# Patient Record
Sex: FEMALE | Race: WHITE | Employment: OTHER | URBAN - METROPOLITAN AREA
[De-identification: names, ages, dates, MRNs, and addresses within clinical notes are randomized per-mention and may not be internally consistent; named-entity substitution may affect disease eponyms.]

---

## 2017-01-23 ENCOUNTER — TRANSFERRED RECORDS (OUTPATIENT)
Dept: HEALTH INFORMATION MANAGEMENT | Facility: CLINIC | Age: 47
End: 2017-01-23

## 2017-08-12 ENCOUNTER — HEALTH MAINTENANCE LETTER (OUTPATIENT)
Age: 47
End: 2017-08-12

## 2017-08-24 ENCOUNTER — TRANSFERRED RECORDS (OUTPATIENT)
Dept: HEALTH INFORMATION MANAGEMENT | Facility: CLINIC | Age: 47
End: 2017-08-24

## 2017-11-17 ENCOUNTER — TRANSFERRED RECORDS (OUTPATIENT)
Dept: HEALTH INFORMATION MANAGEMENT | Facility: CLINIC | Age: 47
End: 2017-11-17

## 2018-01-22 ENCOUNTER — OFFICE VISIT (OUTPATIENT)
Dept: FAMILY MEDICINE | Facility: CLINIC | Age: 48
End: 2018-01-22
Payer: COMMERCIAL

## 2018-01-22 ENCOUNTER — HOSPITAL ENCOUNTER (OUTPATIENT)
Dept: MAMMOGRAPHY | Facility: CLINIC | Age: 48
Discharge: HOME | End: 2018-01-22
Attending: FAMILY MEDICINE | Admitting: FAMILY MEDICINE
Payer: COMMERCIAL

## 2018-01-22 ENCOUNTER — RESULT FOLLOW UP (OUTPATIENT)
Dept: OBGYN | Facility: CLINIC | Age: 48
End: 2018-01-22

## 2018-01-22 VITALS
SYSTOLIC BLOOD PRESSURE: 129 MMHG | HEART RATE: 122 BPM | HEIGHT: 65 IN | BODY MASS INDEX: 25.86 KG/M2 | OXYGEN SATURATION: 97 % | WEIGHT: 155.2 LBS | DIASTOLIC BLOOD PRESSURE: 83 MMHG | TEMPERATURE: 98.3 F

## 2018-01-22 DIAGNOSIS — Z12.31 VISIT FOR SCREENING MAMMOGRAM: ICD-10-CM

## 2018-01-22 DIAGNOSIS — Z12.4 SCREENING FOR MALIGNANT NEOPLASM OF CERVIX: ICD-10-CM

## 2018-01-22 DIAGNOSIS — R87.810 CERVICAL HIGH RISK HPV (HUMAN PAPILLOMAVIRUS) TEST POSITIVE: ICD-10-CM

## 2018-01-22 DIAGNOSIS — Z00.00 HEALTHCARE MAINTENANCE: Primary | ICD-10-CM

## 2018-01-22 DIAGNOSIS — E05.00 GRAVES DISEASE: ICD-10-CM

## 2018-01-22 PROCEDURE — 87624 HPV HI-RISK TYP POOLED RSLT: CPT | Performed by: INTERNAL MEDICINE

## 2018-01-22 PROCEDURE — 77067 SCR MAMMO BI INCL CAD: CPT

## 2018-01-22 PROCEDURE — 99386 PREV VISIT NEW AGE 40-64: CPT | Performed by: INTERNAL MEDICINE

## 2018-01-22 PROCEDURE — G0145 SCR C/V CYTO,THINLAYER,RESCR: HCPCS | Performed by: INTERNAL MEDICINE

## 2018-01-22 NOTE — LETTER
April 5, 2019      Vania BREWER Gerard  [address] [place] [address]      Dear Ms. Gee,    At [facility], your health and wellness is our primary concern. That is why we are following up on a positive high risk HPV test  from 02/06/19, which was reported as HPV 16.  Your Provider had recommended that you have a Colposcopy  completed by 05/06/19. Our records do not show that this has been scheduled.    It is important to complete the follow up that your provider has suggested for you to ensure that there are no worsening changes which may, over time, develop into cancer.      Please call our office at  [phone] to schedule an appointment for a Colposcopy (this cannot be scheduled through [facility]) at your earliest convenience. If you have questions or concerns, please call the clinic and we will be happy to assist you.    If you have completed the tests outside of [facility], please have the results forwarded to our office. We will update the chart for your primary Physician to review before your next annual physical.     Thank you for choosing [facility]!    Sincerely,      Your [place] [facility] Care Team/[facility]

## 2018-01-22 NOTE — PATIENT INSTRUCTIONS
Make an appointment for a lab draw after you have been fasting (no food or drink except water and black coffee) for at least 8 hours.        CALCIUM    Recommendations:  Teenagers and premenopausal women: 1200 mg/day    If you are not eating dairy products you also need 400 IU of vitamin D per day which can be obtained in either a multivitamin or in some of the Calcium tablets.    Dietary sources: These also contain vitamin D  Milk                            8 oz            300 mg  Yogurt                          1 cup           400 mg  Hard cheese                     1.5 oz          300 mg  Cottage cheese                  2 cup           300 mg  Orange juice with Calcium       8 oz            300 mg  Low fat dairy sources are recommended    Supplements:  Tums EX                         300 mg  Tums Ultra                      400 mg  Caltrate 600                    600 mg  Oscal                           500 mg  Oscal/D                         500 mg plus vitamin D  Women's Formula Multivitamin    450 mg        Preventive Health Recommendations  Female Ages 40 to 49    Yearly exam:     See your health care provider every year in order to  1. Review health changes.   2. Discuss preventive care.    3. Review your medicines if your doctor prescribed any.      Get a Pap test every three years (unless you have an abnormal result and your provider advises testing more often).      If you get Pap tests with HPV test, you only need to test every 5 years, unless you have an abnormal result. You do not need a Pap test if your uterus was removed (hysterectomy) and you have not had cancer.      You should be tested each year for STDs (sexually transmitted diseases), if you're at risk.       Ask your doctor if you should have a mammogram.      Have a colonoscopy (test for colon cancer) if someone in your family has had colon cancer or polyps before age 50.       Have a cholesterol test every 5 years.       Have a diabetes test  (fasting glucose) after age 45. If you are at risk for diabetes, you should have this test every 3 years.    Shots: Get a flu shot each year. Get a tetanus shot every 10 years.     Nutrition:     Eat at least 5 servings of fruits and vegetables each day.    Eat whole-grain bread, whole-wheat pasta and brown rice instead of white grains and rice.    Talk to your provider about Calcium and Vitamin D.     Lifestyle    Exercise at least 150 minutes a week (an average of 30 minutes a day, 5 days a week). This will help you control your weight and prevent disease.    Limit alcohol to one drink per day.    No smoking.     Wear sunscreen to prevent skin cancer.    See your dentist every six months for an exam and cleaning.

## 2018-01-22 NOTE — PROGRESS NOTES
SUBJECTIVE:   CC: Vania Gee is an 47 year old woman who presents for preventive health visit.     Chief Complaint   Patient presents with     Physical     w/ pap       Healthy Habits:    Do you get at least three servings of calcium containing foods daily (dairy, green leafy vegetables, etc.)? 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 yes    Do you see a dentist twice per year? No, once per year    Do you have sleep apnea, excessive snoring or daytime drowsiness?no      No other concerns    Today's PHQ-2 Score:   PHQ-2 ( 1999 [employer]) 1/22/2018 3/20/2014   Q1: Little interest or pleasure in doing things 0 0   Q2: Feeling down, depressed or hopeless 0 0   PHQ-2 Score 0 0       Abuse: Current or Past(Physical, Sexual or Emotional)- No  Do you feel safe in your environment - Yes    Social History   Substance Use Topics     Smoking status: Never Smoker     Smokeless tobacco: Never Used     Alcohol use Yes      Comment: 1 PER WEEK     If you drink alcohol do you typically have >3 drinks per day or >7 drinks per week? No                     Reviewed orders with patient.  Reviewed health maintenance and updated orders accordingly - Yes  Patient Active Problem List   Diagnosis     Graves disease     Past Surgical History:   Procedure Laterality Date     APPENDECTOMY       ECTOPIC PREGNANCY SURGERY         Social History   Substance Use Topics     Smoking status: Never Smoker     Smokeless tobacco: Never Used     Alcohol use Yes      Comment: 1 PER WEEK     Family History   Problem Relation Age of Onset     HEART DISEASE Mother      HEART DISEASE Father      Hypertension Father      HEART DISEASE Brother      PACE MAKER         No current  "outpatient prescriptions on file.     No Known Allergies      Patient under age 50, mutual decision reflected in health maintenance.        Pertinent mammograms are reviewed under the imaging tab.  History of abnormal Pap smear: NO - age 30-65 PAP every 5 years with negative HPV co-testing recommended    Reviewed and updated as needed this visit by clinical staffTobacco  Allergies  Med Hx  Surg Hx  Fam Hx  Soc Hx        Reviewed and updated as needed this visit by Provider              ROS:    10 point ROS of systems including Constitutional, Eyes, Respiratory, Cardiovascular, Gastroenterology, Genitourinary, Integumentary, Muscularskeletal, Psychiatric were all negative except for pertinent positives noted in my HPI plus occasional left arm pain that resolves if she shakes the arm out, exophthalmos in left eye    OBJECTIVE:   /83 (BP Location: Right arm, Patient Position: Chair, Cuff Size: Adult Regular)  Pulse 122  Temp 98.3  F (36.8  C) (Tympanic)  Ht 5' 5\" (1.651 m)  Wt 155 lb 3.2 oz (70.4 kg)  SpO2 97%  BMI 25.83 kg/m2  EXAM:  GENERAL: healthy, alert and no distress  EYES: Left exophthalmos, PERRL and conjunctivae and sclerae normal  HENT: ear canals and TM's normal, nose and mouth without ulcers or lesions  NECK: no adenopathy, no asymmetry, masses, or scars and thyroid normal to palpation  RESP: lungs clear to auscultation - no rales, rhonchi or wheezes  CV: slightly tachycardic but regular, normal S1 S2, no S3 or S4, no murmur, click or rub, no peripheral edema and peripheral pulses strong  ABDOMEN: soft, nontender, no hepatosplenomegaly, no masses and bowel sounds normal   (female): normal female external genitalia, normal urethral meatus, vaginal mucosa pink, moist, well rugated, and normal cervix/adnexa/uterus without masses or discharge  MS: no gross musculoskeletal defects noted, no edema  SKIN: no suspicious lesions or rashes  NEURO: Normal strength and tone, mentation intact and " "speech normal  PSYCH: mentation appears normal, affect normal/bright    ASSESSMENT/PLAN:   1. Healthcare maintenance      Pap smear: done today    Immunizations: flu declined    Lab Studies: Glucose due now.  Lipids due next year    Mammogram: Recently completed    Colonoscopy: age 52    - GLUCOSE; Future  - Pap imaged thin layer screen with HPV - recommended age 30 - 65 years (select HPV order below)  - HPV High Risk Types DNA Cervical    2. Screening for malignant neoplasm of cervix    - Pap imaged thin layer screen with HPV - recommended age 30 - 65 years (select HPV order below)  - HPV High Risk Types DNA Cervical    3. Graves disease    She is being monitor for Graves disease, not on medication.  She says she is due for labs, ordered.     - T3, Free; Future  - T4, free; Future  - TSH; Future    COUNSELING:   Reviewed preventive health counseling, as reflected in patient instructions    BP Screening:   Last 3 BP Readings:    BP Readings from Last 3 Encounters:   01/22/18 129/83   03/20/14 116/80       The following was recommended to the patient:  Re-screen BP within a year and recommended lifestyle modifications     reports that she has never smoked. She has never used smokeless tobacco.    Estimated body mass index is 25.83 kg/(m^2) as calculated from the following:    Height as of this encounter: 5' 5\" (1.651 m).    Weight as of this encounter: 155 lb 3.2 oz (70.4 kg).   Weight management plan: Discussed healthy diet and exercise guidelines and patient will follow up in 12 months in clinic to re-evaluate.      Shay Olmos MD  [facility]  "

## 2018-01-22 NOTE — MR AVS SNAPSHOT
After Visit Summary   1/22/2018    Vania Gee    MRN: [id number]           Patient Information     Date Of Birth          1970        Visit Information        Provider Department      1/22/2018 5:00 PM Shay Olmos MD [facility]        Today's [facility] maintenance    -  1    Screening for malignant neoplasm of cervix        Graves disease          Care Instructions    Make an appointment for a lab draw after you have been fasting (no food or drink except water and black coffee) for at least 8 hours.        CALCIUM    Recommendations:  Teenagers and premenopausal women: 1200 mg/day    If you are not eating dairy products you also need 400 IU of vitamin D per day which can be obtained in either a multivitamin or in some of the Calcium tablets.    Dietary sources: These also contain vitamin D  Milk                            8 oz            300 mg  Yogurt                          1 cup           400 mg  Hard cheese                     1.5 oz          300 mg  Cottage cheese                  2 cup           300 mg  Orange juice with Calcium       8 oz            300 mg  Low fat dairy sources are recommended    Supplements:  Tums EX                         300 mg  Tums Ultra                      400 mg  Caltrate 600                    600 mg  Oscal                           500 mg  Oscal/D                         500 mg plus vitamin D  Women's Formula Multivitamin    450 mg        Preventive Health Recommendations  Female Ages 40 to 49    Yearly exam:     See your health care provider every year in order to  1. Review health changes.   2. Discuss preventive care.    3. Review your medicines if your doctor prescribed any.      Get a Pap test every three years (unless you have an abnormal result and your provider advises testing more often).      If you get Pap tests with HPV test, you only need to test every 5 years, unless you have an abnormal result. You do not need  a Pap test if your uterus was removed (hysterectomy) and you have not had cancer.      You should be tested each year for STDs (sexually transmitted diseases), if you're at risk.       Ask your doctor if you should have a mammogram.      Have a colonoscopy (test for colon cancer) if someone in your family has had colon cancer or polyps before age 50.       Have a cholesterol test every 5 years.       Have a diabetes test (fasting glucose) after age 45. If you are at risk for diabetes, you should have this test every 3 years.    Shots: Get a flu shot each year. Get a tetanus shot every 10 years.     Nutrition:     Eat at least 5 servings of fruits and vegetables each day.    Eat whole-grain bread, whole-wheat pasta and brown rice instead of white grains and rice.    Talk to your provider about Calcium and Vitamin D.     Lifestyle    Exercise at least 150 minutes a week (an average of 30 minutes a day, 5 days a week). This will help you control your weight and prevent disease.    Limit alcohol to one drink per day.    No smoking.     Wear sunscreen to prevent skin cancer.    See your dentist every six months for an exam and cleaning.          Follow-ups after your visit        Your next 10 appointments already scheduled     Feb 06, 2018  3:45 PM CST   NEW THYROID EYE with Jose Gillespie MD   [facility] Peds Eye General ([facility])    [address] [id number] [phone]              Future tests that were ordered for you today     Open Future Orders        Priority Expected Expires Ordered    GLUCOSE Routine  1/22/2019 1/22/2018    T3, Free Routine  1/22/2019 1/22/2018    T4, free Routine  1/22/2019 1/22/2018    TSH Routine  1/22/2019 1/22/2018            Who to contact     If you have questions or need follow up information about today's clinic visit or your schedule please contact [facility] directly at [phone].  Normal or non-critical lab  "and imaging results will be communicated to you by MyChart, letter or phone within 4 business days after the clinic has received the results. If you do not hear from us within 7 days, please contact the clinic through [employer] or phone. If you have a critical or abnormal lab result, we will notify you by phone as soon as possible.  Submit refill requests through [facility] As [facility] or call your pharmacy and they will forward the refill request to us. Please allow 3 business days for your refill to be completed.          Additional Information About Your Visit        [employer] Information     [facility] gives you secure access to your electronic health record. If you see a primary care provider, you can also send messages to your care team and make appointments. If you have questions, please call your primary care clinic.  If you do not have a primary care provider, please call [phone] and they will assist you.        Care EveryWhere ID     This is your Care EveryWhere ID. This could be used by other organizations to access your [place] medical records  [id number]        Your Vitals Were     Pulse Temperature Height Pulse Oximetry BMI (Body Mass Index)       122 98.3  F (36.8  C) (Tympanic) 5' 5\" (1.651 m) 97% 25.83 kg/m2        Blood Pressure from Last 3 Encounters:   01/22/18 129/83   03/20/14 116/80    Weight from Last 3 Encounters:   01/22/18 155 lb 3.2 oz (70.4 kg)   03/20/14 141 lb 12.8 oz (64.3 kg)              We Performed the Following     HPV High Risk Types DNA Cervical     Pap imaged thin layer screen with HPV - recommended age 30 - 65 years (select HPV order below)        Primary Care Provider Fax #    Physician No Ref-Primary [phone]       No address on file        Equal Access to Services     HONG RICCI : Micki Kwok, devante carrasquillo, kayode astorga. So [facility] [phone].    ATENCIÓN: Si habla español, tiene a gomez disposición servicios " brain de asistencia lingüística. Silver montes [phone].    We comply with applicable federal civil rights laws and [place] laws. We do not discriminate on the basis of race, color, national origin, age, disability, sex, sexual orientation, or gender identity.            Thank you!     Thank you for choosing [facility]  for your care. Our goal is always to provide you with excellent care. Hearing back from our patients is one way we can continue to improve our services. Please take a few minutes to complete the written survey that you may receive in the mail after your visit with us. Thank you!             Your Updated Medication List - Protect others around you: Learn how to safely use, store and throw away your medicines at www.disposemymeds.org.      Notice  As of 1/22/2018  5:28 PM    You have not been prescribed any medications.

## 2018-01-22 NOTE — NURSING NOTE
"Chief Complaint   Patient presents with     Physical     w/ pap       Initial /83 (BP Location: Right arm, Patient Position: Chair, Cuff Size: Adult Regular)  Pulse 122  Temp 98.3  F (36.8  C) (Tympanic)  Ht 5' 5\" (1.651 m)  Wt 155 lb 3.2 oz (70.4 kg)  SpO2 97%  BMI 25.83 kg/m2 Estimated body mass index is 25.83 kg/(m^2) as calculated from the following:    Height as of this encounter: 5' 5\" (1.651 m).    Weight as of this encounter: 155 lb 3.2 oz (70.4 kg).  Medication Reconciliation: complete   Maribel PACKER CMA (AAMA)    "

## 2018-01-24 LAB
COPATH REPORT: NORMAL
PAP: NORMAL

## 2018-01-26 PROBLEM — R87.810 CERVICAL HIGH RISK HPV (HUMAN PAPILLOMAVIRUS) TEST POSITIVE: Status: ACTIVE | Noted: 2018-01-22

## 2018-01-26 LAB
FINAL DIAGNOSIS: ABNORMAL
HPV HR 12 DNA CVX QL NAA+PROBE: NEGATIVE
HPV16 DNA SPEC QL NAA+PROBE: NEGATIVE
HPV18 DNA SPEC QL NAA+PROBE: POSITIVE
SPECIMEN DESCRIPTION: ABNORMAL
SPECIMEN SOURCE CVX/VAG CYTO: ABNORMAL

## 2018-01-31 NOTE — PROGRESS NOTES
1/22/18 NIL pap, + HR HPV # 18 (no 16 or other). Plan: colp   1/31/18 Pt notified. She will call the [facility] to schedule the colp.  2/22/18 [facility] Bx & ECC - Negative. Polyp - normal endocervical tissue. Plan cotest in 1 year.   2/27/18 Provider released result to [employer] - pt has not viewed it yet.   2/27/18 Message left to return call.   2/27/18 Pt notified by phone of result and f/u plan.   2/6/19 NIL Pap, + HR HPV 16 (neg 18 & other). Plan colp  2/12/19 Pt notified.   04/05/19 [facility] reminder letter sent. ([facility])  5/7/19 3mo [facility] not done, updated to 6mo [facility]/Pap due by 8/6/19 (rlm)  6/13/19 [facility] ECC negative; cotest in 1 year (emilie)  6/19/19 Pt viewed result on [facility] (emilie)

## 2018-02-06 ENCOUNTER — OFFICE VISIT (OUTPATIENT)
Dept: OPHTHALMOLOGY | Facility: CLINIC | Age: 48
End: 2018-02-06
Attending: OPHTHALMOLOGY
Payer: COMMERCIAL

## 2018-02-06 DIAGNOSIS — E05.00 GRAVES DISEASE: ICD-10-CM

## 2018-02-06 DIAGNOSIS — E05.00 PROPTOSIS DUE TO THYROID DISORDER: ICD-10-CM

## 2018-02-06 DIAGNOSIS — H57.89 THYROID EYE DISEASE: ICD-10-CM

## 2018-02-06 DIAGNOSIS — H57.89 THYROID EYE DISEASE: Primary | ICD-10-CM

## 2018-02-06 DIAGNOSIS — H02.536 EYELID RETRACTION OF LEFT EYE: ICD-10-CM

## 2018-02-06 DIAGNOSIS — E07.9 THYROID EYE DISEASE: ICD-10-CM

## 2018-02-06 DIAGNOSIS — Z00.00 HEALTHCARE MAINTENANCE: ICD-10-CM

## 2018-02-06 DIAGNOSIS — E07.9 THYROID EYE DISEASE: Primary | ICD-10-CM

## 2018-02-06 LAB
GLUCOSE SERPL-MCNC: 93 MG/DL (ref 70–99)
T3FREE SERPL-MCNC: 3.5 PG/ML (ref 2.3–4.2)
T4 FREE SERPL-MCNC: 1.43 NG/DL (ref 0.76–1.46)
TSH SERPL DL<=0.005 MIU/L-ACNC: <0.01 MU/L (ref 0.4–4)
TSH SERPL DL<=0.005 MIU/L-ACNC: <0.01 MU/L (ref 0.4–4)

## 2018-02-06 PROCEDURE — 84443 ASSAY THYROID STIM HORMONE: CPT | Performed by: INTERNAL MEDICINE

## 2018-02-06 PROCEDURE — 84439 ASSAY OF FREE THYROXINE: CPT | Performed by: INTERNAL MEDICINE

## 2018-02-06 PROCEDURE — 84445 ASSAY OF TSI GLOBULIN: CPT | Performed by: INTERNAL MEDICINE

## 2018-02-06 PROCEDURE — 84481 FREE ASSAY (FT-3): CPT | Performed by: INTERNAL MEDICINE

## 2018-02-06 PROCEDURE — 82947 ASSAY GLUCOSE BLOOD QUANT: CPT | Performed by: INTERNAL MEDICINE

## 2018-02-06 PROCEDURE — 36415 COLL VENOUS BLD VENIPUNCTURE: CPT | Performed by: INTERNAL MEDICINE

## 2018-02-06 PROCEDURE — 92285 EXTERNAL OCULAR PHOTOGRAPHY: CPT | Mod: ZF | Performed by: OPHTHALMOLOGY

## 2018-02-06 PROCEDURE — G0463 HOSPITAL OUTPT CLINIC VISIT: HCPCS | Mod: ZF

## 2018-02-06 ASSESSMENT — VISUAL ACUITY
OS_SC: 20/20
OS_SC+: +2
OD_SC: 20/15
METHOD: SNELLEN - LINEAR

## 2018-02-06 ASSESSMENT — TONOMETRY
OD_IOP_MMHG: 13
IOP_METHOD: ICARE
OS_IOP_MMHG: 14

## 2018-02-06 ASSESSMENT — MARGIN REFLEX DISTANCE
OD_MRD2: 5
OS_MRD2: 5
OD_MRD1: 2
OS_MRD1: 7

## 2018-02-06 ASSESSMENT — CONF VISUAL FIELD
OS_NORMAL: 1
OD_NORMAL: 1
METHOD: COUNTING FINGERS

## 2018-02-06 ASSESSMENT — LAGOPHTHALMOS
OD_LAGOPHTHALMOS: 0
OS_LAGOPHTHALMOS: 0

## 2018-02-06 ASSESSMENT — CUP TO DISC RATIO
OS_RATIO: 0.2
OD_RATIO: 0.2

## 2018-02-06 ASSESSMENT — SLIT LAMP EXAM - LIDS
COMMENTS: NORMAL
COMMENTS: UPPER LID RETRACTION

## 2018-02-06 ASSESSMENT — EXTERNAL EXAM - LEFT EYE: OS_EXAM: UPPER EYELID RETRACTION

## 2018-02-06 ASSESSMENT — EXTERNAL EXAM - RIGHT EYE: OD_EXAM: NORMAL

## 2018-02-06 NOTE — MR AVS SNAPSHOT
After Visit Summary   2/6/2018    Vania Gee    MRN: [id number]           Patient Information     Date Of Birth          1970        Visit Information        Provider Department      2/6/2018 3:45 PM Jose Gillespie MD [facility] Peds Eye General        Today's Diagnoses     Thyroid eye disease    -  1       Follow-ups after your visit        Additional Services     ENDOCRINOLOGY ADULT REFERRAL       Your provider has referred you to: [facility]: Endocrinology and Diabetes Clinic [facility] [phone]   [email or web address]   Referred to see Dr. Patricia Martins    Please be aware that coverage of these services is subject to the terms and limitations of your health insurance plan.  Call member services at your health plan with any benefit or coverage questions.      Please bring the following to your appointment:    >>   Any x-rays, CTs or MRIs which have been performed.  Contact the facility where they were done to arrange for  prior to your scheduled appointment.    >>   List of current medications   >>   This referral request   >>   Any documents/labs given to you for this referral                  Your next 10 appointments already scheduled     Feb 22, 2018 11:00 AM CST   Office Visit with Boni Polk MD, [facility]   [facility]    [address] [address] [phone]           Bring a current list of meds and any records pertaining to this visit. For Physicals, please bring immunization records and any forms needing to be filled out. Please arrive 10 minutes early to complete paperwork.            May 01, 2018  1:00 PM CDT   RETURN THYROID EYE with Jose Gillespie MD   [facility] Peds Eye General ([facility])    [address]  [address] [address] [phone]              Future tests that were ordered for  you today     Open Future Orders        Priority Expected Expires Ordered    Thyroid stimulating immunoglobulin Routine  5/7/2018 2/6/2018    TSH with free T4 reflex Routine  5/7/2018 2/6/2018    T3 Free Routine  5/7/2018 2/6/2018            Who to contact     Please call your clinic at [phone] to:    Ask questions about your health    Make or cancel appointments    Discuss your medicines    Learn about your test results    Speak to your doctor   If you have compliments or concerns about an experience at your clinic, or if you wish to file a complaint, please contact [facility] Patient Relations at [phone] or email us at [email or web address]         Additional Information About Your Visit        [employer] Information     [employer] gives you secure access to your electronic health record. If you see a primary care provider, you can also send messages to your care team and make appointments. If you have questions, please call your primary care clinic.  If you do not have a primary care provider, please call [phone] and they will assist you.      [employer] is an electronic gateway that provides easy, online access to your medical records. With [employer], you can request a clinic appointment, read your test results, renew a prescription or communicate with your care team.     To access your existing account, please contact your [facility] or call [phone] for assistance.        Care EveryWhere ID     This is your Care EveryWhere ID. This could be used by other organizations to access your [place] medical records  [id number]         Blood Pressure from Last 3 Encounters:   01/22/18 129/83   03/20/14 116/80    Weight from Last 3 Encounters:   01/22/18 70.4 kg (155 lb 3.2 oz)   03/20/14 64.3 kg (141 lb 12.8 oz)              We Performed the Following     ENDOCRINOLOGY ADULT REFERRAL     External Photos Thyroid Series        Primary Care  Provider Fax #    Physician No Ref-Primary [phone]       No address on file        Equal Access to Services     HONG RICCI : Hadii aad ku hadnellyemily Vanessaengin, blairenick lanevicentaha, akshat jeff markveto, kayode garrisonin hayaaeliud floresharshil german david sierra. So [facility] [phone].    ATENCIÓN: Si habla español, tiene a gomez disposición servicios gratuitos de asistencia lingüística. Llame al [phone].    We comply with applicable federal civil rights laws and [place] laws. We do not discriminate on the basis of race, color, national origin, age, disability, sex, sexual orientation, or gender identity.            Thank you!     Thank you for choosing [facility]  for your care. Our goal is always to provide you with excellent care. Hearing back from our patients is one way we can continue to improve our services. Please take a few minutes to complete the written survey that you may receive in the mail after your visit with us. Thank you!             Your Updated Medication List - Protect others around you: Learn how to safely use, store and throw away your medicines at www.disposemymeds.org.      Notice  As of 2/6/2018  4:46 PM    You have not been prescribed any medications.

## 2018-02-06 NOTE — PROGRESS NOTES
Ophthalmology Thyroid Clinic New Patient (ITEDS)    Patient: Vania Gee MRN# [id number]   YOB: 1970 Age: 48 year old   Date of Visit: Feb 6, 2018    Chief Complaint: Vania Gee is a 48 year old year old female who presents for evaluation of thyroid eye disease.    Orbitopathy Thyroid General   Date of onset: October 2016  Chronic  Symptoms: dryness, pain, bulging Date of onset: Dec 2016, but had symptoms 8 months prior  Chronic  Symptoms: Weakness, shaking, heart racing, weight loss    No Pretibial myxedema/clubbing Occupation: farmer/realtor/  Smoking: none  Allergies: none  Family History:alopecia, vitiligo, thyroid issues  Selenium: not currently taking   Progress over last 2 months:  stable Progress over last 2 months:  Immune Disorders:  None     Ophthalmologist: none  Studies: none  Therapy: none Endocrinologist: none currently, has seen [facility] previously  Studies: ultrasound  Therapy: methimazole transiently, but currently not on anything    Current thyroid status: not clear at this time. Past Medical History:   Diagnosis Date     Cervical high risk HPV (human papillomavirus) test positive 1/22/2018 1/22/18 NIL pap, + HR HPV # 18 (no 16 or other). Plan: colp      Graves disease 1/22/2018       No current outpatient prescriptions on file.          Examination:  Base Eye Exam     Visual Acuity (Snellen - Linear)      Right Left   Dist sc 20/15 20/20 +2         Tonometry ([facility], 3:04 PM)      Right Left   Pressure 13 14         Pupils      Pupils APD   Right PERRL None   Left PERRL None         Visual Fields (Counting fingers)      Left Right   Result Full Full         Neuro/Psych     Mood/Affect:  Normal            Additional Tests     Color      Right Left   Ishihara 11/11 11/11         Billy     Billy:  Normal            Strabismus Exam        Method:  Alternate cover Distance Near Near +3.00DS Near Bifocals     Correction:  sc   Ortho'                         - - - - - -  RHypo 1 -1 -1 -tr    R Tilt                         Ortho  - -  - -  Ortho  -tr  - -  Ortho                      L Tilt       -tr -1 -1  Ortho  - - - - - -            DVD:      DVD:           Nystagmus:  None       AHP:  small Chin up                    Disease Grade (ITEDS)   V (optic neuropathy) I (inflammation) S (diplopia) S (restriction) A (appearance/exposure)   Scoring n Caruncular edema: 0  Chemosis: 0  Conj redness: 0  Lid redness: 0  Lid edema: 0  Retrobulbar ache: 1  Diurnal variation: 0   None: 0  With gaze: 1  Intermittent: 2  Constant: 3    Head tilt: n >45: 0  30-45: 1  15-30: 2  <15: 3   Lid stare: n  Light sensitivity: y  Bulging eyes: y  Tearing: n  Ocular irritation: y    None-severe (0-3)   Total 0/1 1/10 0/3 0/3 1/3   Progress  s s s s  s      Laboratory and Imaging Results:  TSH   Date Value Ref Range Status   12/12/2016 <0.01 (L) 0.40 - 4.00 mU/L Final   ]  T4 Free   Date Value Ref Range Status   12/12/2016 1.90 (H) 0.76 - 1.46 ng/dL Final     TSH 11/2017: <0.01  TSI: none previously    CT Scan: none  Visual field testing: none    Assessment/Plan:    Vania Gee is a 48 year old year old female who presents for evaluation of thyroid eye disease.  She initially noticed eye symptoms about 1.5 years ago.  Her thyroid disease is complicated and at this time, she has not consistently been on any therapy.  Over the past 6 months or so, her eyes have been stable. She is bothered by fatigue and discomfort primarily in the left eye and is bothered that her lid is too high on that side.    She is not a smoker, and will take selenium 100mcg daily.      On examination, she has mild left proptosis with upper lid retraction.  Her disease appears to be inactive at this time.    We discussed the natural history of thyroid eye disease and the importance of controlling thyroid levels.  We will obtain a TSI and a repeat TSH/T3/T4 today.  She would be interested in seeing endocrinology here  for an additional opinion- she felt that [place] endocrinology did not provide clear recommendations on medical treatment.    Discussed that we can consider left upper eyelid retraction repair, but would prefer to have TSI checked and thryoid levels under control prior to surgery.    Measurements on Hertels stable from [facility] exam from August 2017.    Refer to Patricia Martins with endocrinology.      Overall disease activity: Likely inactive.         Attending Physician Attestation:  Complete documentation of historical and exam elements from today's encounter can be found in the full encounter summary report (not reduplicated in this progress note). I personally obtained the chief complaint(s) and history of present illness.  I confirmed and edited as necessary the review of systems, past medical/surgical history, family history, social history, and examination findings as documented by others; and I examined the patient myself. I personally reviewed the relevant tests, images, and reports as documented above. I formulated and edited as necessary the assessment and plan and discussed the findings and management plan with the patient and family.  -Jose Kelley MD 3:18 PM 2/6/2018

## 2018-02-06 NOTE — LETTER
February 15, 2018    RE: Vania Gee  : 1970  MRN: [id number]    Dear Dr. Yunior Gomez:    Thank you for referring your patient, Vania Gee, to our multi-disciplinary thyroid eye disease clinic recently.  After a thorough history followed by a neuro-ophthalmology, strabismus, and oculoplastics examination, we came to the following conclusions:     Date of Visit: 2018    Chief Complaint: Vania Gee is a 48 year old year old female who presents for evaluation of thyroid eye disease.    Orbitopathy Thyroid General   Date of onset: 2016  Chronic  Symptoms: dryness, pain, bulging Date of onset: Dec 2016, but had symptoms 8 months prior  Chronic  Symptoms: Weakness, shaking, heart racing, weight loss    No Pretibial myxedema/clubbing Occupation: farmer/realtor/  Smoking: none  Allergies: none  Family History:alopecia, vitiligo, thyroid issues  Selenium: not currently taking   Progress over last 2 months:  stable Progress over last 2 months:  Immune Disorders:  None     Ophthalmologist: none  Studies: none  Therapy: none Endocrinologist: none currently, has seen [facility] previously  Studies: ultrasound  Therapy: methimazole transiently, but currently not on anything    Current thyroid status: not clear at this time. Past Medical History:   Diagnosis Date     Cervical high risk HPV (human papillomavirus) test positive 2018 NIL pap, + HR HPV # 18 (no 16 or other). Plan: colp      Graves disease 2018       No current outpatient prescriptions on file.          Examination:  Base Eye Exam     Visual Acuity (Snellen - Linear)      Right Left   Dist sc 20/15 20/20 +2         Tonometry ([facility], 3:04 PM)      Right Left   Pressure 13 14         Pupils      Pupils APD   Right PERRL None   Left PERRL None         Visual Fields (Counting fingers)      Left Right   Result Full Full         Neuro/Psych     Mood/Affect:  Normal            Additional Tests      Color      Right Left   Brianihara 11/11 11/11         Billy     Billy:  Normal            Strabismus Exam        Method:  Alternate cover Distance Near Near +3.00DS Near Bifocals     Correction:  sc   Ortho'                        - - - - - -  RHypo 1 -1 -1 -tr    R Tilt                         Ortho  - -  - -  Ortho  -tr  - -  Ortho                      L Tilt       -tr -1 -1  Ortho  - - - - - -            DVD:      DVD:           Nystagmus:  None       AHP:  small Chin up                    Disease Grade (ITEDS)   V (optic neuropathy) I (inflammation) S (diplopia) S (restriction) A (appearance/exposure)   Scoring n Caruncular edema: 0  Chemosis: 0  Conj redness: 0  Lid redness: 0  Lid edema: 0  Retrobulbar ache: 1  Diurnal variation: 0   None: 0  With gaze: 1  Intermittent: 2  Constant: 3    Head tilt: n >45: 0  30-45: 1  15-30: 2  <15: 3   Lid stare: n  Light sensitivity: y  Bulging eyes: y  Tearing: n  Ocular irritation: y    None-severe (0-3)   Total 0/1 1/10 0/3 0/3 1/3   Progress  s s s s  s      Laboratory and Imaging Results:  TSH   Date Value Ref Range Status   12/12/2016 <0.01 (L) 0.40 - 4.00 mU/L Final   ]  T4 Free   Date Value Ref Range Status   12/12/2016 1.90 (H) 0.76 - 1.46 ng/dL Final     TSH 11/2017: <0.01  TSI: none previously    CT Scan: none  Visual field testing: none    Assessment/Plan:  Vania Gee is a 48 year old year old female who presents for evaluation of thyroid eye disease.  She initially noticed eye symptoms about 1.5 years ago.  Her thyroid disease is complicated and at this time, she has not consistently been on any therapy.  Over the past 6 months or so, her eyes have been stable. She is bothered by fatigue and discomfort primarily in the left eye and is bothered that her lid is too high on that side.    She is not a smoker, and will take selenium 100mcg daily.      On examination, she has mild left proptosis with upper lid retraction.  Her disease appears to be inactive  at this time.    We discussed the natural history of thyroid eye disease and the importance of controlling thyroid levels.  We will obtain a TSI and a repeat TSH/T3/T4 today.  She would be interested in seeing endocrinology here for an additional opinion- she felt that [place] endocrinology did not provide clear recommendations on medical treatment.    Discussed that we can consider left upper eyelid retraction repair, but would prefer to have TSI checked and thryoid levels under control prior to surgery.    Measurements on Hertels stable from [facility] exam from August 2017.    Refer to Patricia Martins with endocrinology.      Overall disease activity: Likely inactive.      Thank you for trusting us with the care of your patient.  For further exam details, please feel free to contact our office for additional records.  If you wish to contact us directly regarding this patient please email us or give our clinic a call to arrange a phone conversation.    Sincerely,    Jose Gillespie MD  , Neuro-Ophthalmology and Adult Strabismus  Department of Ophthalmology and Visual Neurosciences  [facility]  [email or web address]    Seth Diaz MD    Oculoplastic and Orbital Surgery   Department of Ophthalmology and Visual Neurosciences  [facility]  [email or web address]    DX: thyroid eye disease, left upper eyelid retraction

## 2018-02-06 NOTE — NURSING NOTE
Chief Complaint   Patient presents with     Graves Thyrotoxic Exoph     Dx 1/17, had symptoms about 12 mos prior (lost weight, heart racing). Last labs were done 6 mos ago, would like them repeated. Denies diplopia, mild redness, tearing, no drops. Noted Left proptosis, seems stable since Dx. No decrease in vision/

## 2018-02-12 LAB — TSI SER-ACNC: <1 TSI INDEX

## 2018-02-22 ENCOUNTER — OFFICE VISIT (OUTPATIENT)
Dept: OBGYN | Facility: CLINIC | Age: 48
End: 2018-02-22
Payer: COMMERCIAL

## 2018-02-22 VITALS
BODY MASS INDEX: 24.63 KG/M2 | WEIGHT: 148 LBS | DIASTOLIC BLOOD PRESSURE: 97 MMHG | SYSTOLIC BLOOD PRESSURE: 135 MMHG | TEMPERATURE: 98.2 F | HEART RATE: 108 BPM

## 2018-02-22 DIAGNOSIS — N84.1 CERVICAL POLYP: ICD-10-CM

## 2018-02-22 DIAGNOSIS — R87.810 CERVICAL HIGH RISK HPV (HUMAN PAPILLOMAVIRUS) TEST POSITIVE: Primary | ICD-10-CM

## 2018-02-22 PROCEDURE — 88305 TISSUE EXAM BY PATHOLOGIST: CPT | Performed by: OBSTETRICS & GYNECOLOGY

## 2018-02-22 PROCEDURE — 57454 BX/CURETT OF CERVIX W/SCOPE: CPT | Performed by: OBSTETRICS & GYNECOLOGY

## 2018-02-22 NOTE — NURSING NOTE
"Initial BP (!) 135/97 (BP Location: Left arm, Patient Position: Chair, Cuff Size: Adult Regular)  Pulse 108  Temp 98.2  F (36.8  C) (Tympanic)  Wt 148 lb (67.1 kg)  Breastfeeding? No  BMI 24.63 kg/m2 Estimated body mass index is 24.63 kg/(m^2) as calculated from the following:    Height as of 1/22/18: 5' 5\" (1.651 m).    Weight as of this encounter: 148 lb (67.1 kg). .    Bria Arriaga    "

## 2018-02-22 NOTE — PROGRESS NOTES
Vania Gee is a 48 year old female P3 (SVDx3)   who presents for abnormal pap smear evaluation referred by Shay Escobar MD.     Pap smear hx is as follows:   1/22/18 NIL pap, + HR HPV # 18 (no 16 or other). Plan: colp       This will be her initial colposcopy.    No LMP recorded.   UPT today is negative      Patient does not smoke    Type of contraception: none  Age at first sexual intercourse: 17-18  Number of sexual partners (lifetime): 8  Past GYN history:  No STD history  Prior cervical/vaginal disease: Normal exam without visible pathology.  Prior cervical treatment: no treatment.      PROCEDURE:  Before the procedure, it was ensured that the patient was educated regarding the nature of her findings to date, the implications, and what was to be done. She has been made aware of the role of HPV, the natural history of infection, ways to minimize her future risk, the effect of HPV on the cervix, and treatment options available should they be indicated.  The details of the colposcopic procedure were reviewed.  All questions were answered before proceeding, and informed consent was therefore obtained.    Speculum placed in vagina and excellent visualization of cervix   acheived, cervix swabbed x 3 with acetic acid solution.    FINDINGS:  EXAM:  Blood pressure (!) 135/97, pulse 108, temperature 98.2  F (36.8  C), temperature source Tympanic, weight 148 lb (67.1 kg), not currently breastfeeding.  BMI= Body mass index is 24.63 kg/(m^2).  No LMP recorded.  General - pleasant female in no acute distress.  Neurological - alert and oriented X 3  Psychiatric - normal mood and affect    Pelvic-  Cervix: acetowhitening noted 5:00 and 11:00; biopsies taken (2)   Small cervical polyp was also seen.   Please refer to images section for details.    Pap repeated?:  No  SCJ seen?:  yes    ECC done?:  Yes   Lugol's solution used?:  No  Satisfactory examination?:  yes    Cervical polyp was removed with ringed forceps.      ASSESSMENT: 1) SHAJI 1. 2) Cervical polyp    PLAN: 1) specimens labelled and sent to Pathology, will base further treatment on Pathology findings, post biopsy instructions given to patient and call to discuss Pathology results    2) specimen labelled and sent to Pathology for confirmation, reassurance provided      A copy of the chart will be sent to the referring provider.    Boni Polk MD  [facility]

## 2018-02-22 NOTE — MR AVS SNAPSHOT
After Visit Summary   2/22/2018    Vania Gee    MRN: [id number]           Patient Information     Date Of Birth          1970        Visit Information        Provider Department      2/22/2018 11:00 AM Boni Polk MD; [facility]        Today's Diagnoses     Cervical high risk HPV (human papillomavirus) test positive    -  1    Cervical polyp           Follow-ups after your visit        Your next 10 appointments already scheduled     May 01, 2018  1:00 PM CDT   RETURN THYROID EYE with Jose Gillespie MD   [facility] ([facility])    [address]  [place] [address] [facility] [address] [phone]              Who to contact     If you have questions or need follow up information about today's clinic visit or your schedule please contact [facility] directly at [phone].  Normal or non-critical lab and imaging results will be communicated to you by MyChart, letter or phone within 4 business days after the clinic has received the results. If you do not hear from us within 7 days, please contact the clinic through MyChart or phone. If you have a critical or abnormal lab result, we will notify you by phone as soon as possible.  Submit refill requests through [employer] or call your pharmacy and they will forward the refill request to us. Please allow 3 business days for your refill to be completed.          Additional Information About Your Visit        MyChart Information     [employer] gives you secure access to your electronic health record. If you see a primary care provider, you can also send messages to your care team and make appointments. If you have questions, please call your primary care clinic.  If you do not have a primary care provider, please call [phone] and they will assist you.        Care EveryWhere ID     This is your Care EveryWhere ID. This could be used by other  organizations to access your [place] medical records  [id number]        Your Vitals Were     Pulse Temperature Breastfeeding? BMI (Body Mass Index)          108 98.2  F (36.8  C) (Tympanic) No 24.63 kg/m2         Blood Pressure from Last 3 Encounters:   02/22/18 (!) 135/97   01/22/18 129/83   03/20/14 116/80    Weight from Last 3 Encounters:   02/22/18 148 lb (67.1 kg)   01/22/18 155 lb 3.2 oz (70.4 kg)   03/20/14 141 lb 12.8 oz (64.3 kg)              We Performed the Following     COLP CERVIX/UPPER VAGINA W BX CERVIX/ENDOCERV CURETT     Surgical pathology exam        Primary Care Provider Fax #    Physician No Ref-Primary [phone]       No address on file        Equal Access to Services     HONG RICCI : Micki Kwok, wamirna luqadaha, qaybta kaalmanick contreras, kayode hernandez . So [facility] [phone].    ATENCIÓN: Si habla español, tiene a gomez disposición servicios gratuitos de asistencia lingüística. Llame al [phone].    We comply with applicable federal civil rights laws and [place] laws. We do not discriminate on the basis of race, color, national origin, age, disability, sex, sexual orientation, or gender identity.            Thank you!     Thank you for choosing [facility]  for your care. Our goal is always to provide you with excellent care. Hearing back from our patients is one way we can continue to improve our services. Please take a few minutes to complete the written survey that you may receive in the mail after your visit with us. Thank you!             Your Updated Medication List - Protect others around you: Learn how to safely use, store and throw away your medicines at www.disposemymeds.org.      Notice  As of 2/22/2018 12:45 PM    You have not been prescribed any medications.

## 2018-02-26 LAB — COPATH REPORT: NORMAL

## 2018-02-27 NOTE — PROGRESS NOTES
Inform patient that her colposcopy returned benign.   She is to return in 1 year for cotesting Pap smear.   Also, what was thought to be a polyp at her cervical opening was just normal endocervical tissue.       Boni Polk MD  [facility]

## 2018-03-08 ENCOUNTER — MEDICAL CORRESPONDENCE (OUTPATIENT)
Dept: HEALTH INFORMATION MANAGEMENT | Facility: CLINIC | Age: 48
End: 2018-03-08

## 2018-05-22 ENCOUNTER — TELEPHONE (OUTPATIENT)
Dept: FAMILY MEDICINE | Facility: CLINIC | Age: 48
End: 2018-05-22

## 2018-05-22 DIAGNOSIS — E05.00 GRAVES DISEASE: Primary | ICD-10-CM

## 2018-05-22 NOTE — TELEPHONE ENCOUNTER
Patient seen Dr. Olmos 01/22/18:  3. Graves disease     She is being monitor for Graves disease, not on medication.  She says she is due for labs, ordered.      - T3, Free; Future  - T4, free; Future  - TSH; Future    Looks like these labs were completed 02/06/18. I did not see further recommendations. I have pended some labs. Please review.     Clarita RITCHIE RN

## 2018-05-22 NOTE — TELEPHONE ENCOUNTER
Patient was notified of message below. I offered to give patient scheduling line but she did not have a pen/paper available and stated she cannot log into [employer]. She states she will check labs first then call back for number to schedule with the endocrinologist.      Clarita RITCHIE RN

## 2018-05-22 NOTE — TELEPHONE ENCOUNTER
Reason for Call: Request for an order or referral:    Order or referral being requested: Complete work up for thyroid panel - pt has graves disease.    Date needed: as soon as possible    Has the patient been seen by the PCP for this problem? Discussed at physical    Additional comments: Pt needs to have labs put in for complete work up for graves disease. She has these done routinely.    Phone number Patient can be reached at:  Home number on file [phone] (home)    Best Time:  any    Can we leave a detailed message on this number?      Call taken on 5/22/2018 at 8:47 AM by Naida Vazquez

## 2018-08-20 DIAGNOSIS — E05.00 GRAVES DISEASE: ICD-10-CM

## 2018-08-20 LAB
T3FREE SERPL-MCNC: 2.4 PG/ML (ref 2.3–4.2)
T4 FREE SERPL-MCNC: 0.87 NG/DL (ref 0.76–1.46)
TSH SERPL DL<=0.005 MIU/L-ACNC: 4.23 MU/L (ref 0.4–4)

## 2018-08-20 PROCEDURE — 84443 ASSAY THYROID STIM HORMONE: CPT | Performed by: INTERNAL MEDICINE

## 2018-08-20 PROCEDURE — 84439 ASSAY OF FREE THYROXINE: CPT | Performed by: INTERNAL MEDICINE

## 2018-08-20 PROCEDURE — 84481 FREE ASSAY (FT-3): CPT | Performed by: INTERNAL MEDICINE

## 2018-08-20 PROCEDURE — 36415 COLL VENOUS BLD VENIPUNCTURE: CPT | Performed by: INTERNAL MEDICINE

## 2018-10-02 ENCOUNTER — OFFICE VISIT (OUTPATIENT)
Dept: OPHTHALMOLOGY | Facility: CLINIC | Age: 48
End: 2018-10-02
Attending: OPHTHALMOLOGY
Payer: COMMERCIAL

## 2018-10-02 DIAGNOSIS — H57.89 THYROID EYE DISEASE: Primary | ICD-10-CM

## 2018-10-02 DIAGNOSIS — H02.401 INVOLUTIONAL PTOSIS, ACQUIRED, RIGHT: ICD-10-CM

## 2018-10-02 DIAGNOSIS — E07.9 THYROID EYE DISEASE: Primary | ICD-10-CM

## 2018-10-02 DIAGNOSIS — E05.00 PROPTOSIS DUE TO THYROID DISORDER: ICD-10-CM

## 2018-10-02 DIAGNOSIS — H02.536 EYELID RETRACTION OF LEFT EYE: ICD-10-CM

## 2018-10-02 PROCEDURE — G0463 HOSPITAL OUTPT CLINIC VISIT: HCPCS | Mod: 25,ZF

## 2018-10-02 PROCEDURE — 92082 INTERMEDIATE VISUAL FIELD XM: CPT | Mod: ZF | Performed by: OPHTHALMOLOGY

## 2018-10-02 PROCEDURE — 92285 EXTERNAL OCULAR PHOTOGRAPHY: CPT | Mod: ZF | Performed by: OPHTHALMOLOGY

## 2018-10-02 ASSESSMENT — CUP TO DISC RATIO
OS_RATIO: 0.3
OD_RATIO: 0.25

## 2018-10-02 ASSESSMENT — LAGOPHTHALMOS
OS_LAGOPHTHALMOS: 0
OD_LAGOPHTHALMOS: 0

## 2018-10-02 ASSESSMENT — TONOMETRY
OS_IOP_MMHG: 22
OD_IOP_MMHG: 22
IOP_METHOD: ICARE B/B, CB

## 2018-10-02 ASSESSMENT — CONF VISUAL FIELD
METHOD: COUNTING FINGERS
OD_NORMAL: 1
OS_NORMAL: 1

## 2018-10-02 ASSESSMENT — VISUAL ACUITY
OS_SC: 20/20
OS_SC+: +2
OD_SC: 20/15
METHOD: SNELLEN - LINEAR

## 2018-10-02 ASSESSMENT — MARGIN REFLEX DISTANCE
OS_MRD1: 8
OD_MRD1: 3

## 2018-10-02 ASSESSMENT — EXTERNAL EXAM - RIGHT EYE: OD_EXAM: NORMAL

## 2018-10-02 ASSESSMENT — SLIT LAMP EXAM - LIDS
COMMENTS: UPPER LID RETRACTION
COMMENTS: NORMAL

## 2018-10-02 ASSESSMENT — EXTERNAL EXAM - LEFT EYE: OS_EXAM: UPPER EYELID RETRACTION

## 2018-10-02 NOTE — NURSING NOTE
Chief Complaints and History of Present Illnesses   Patient presents with     Thyroid Disease     Vision is stable since last visit, no diplopia noted. Patient notes increased drooping RUL, has to hold lid up to watch TV, becoming very bothersome. Pressure behind LE is constant, has headaches in/around LE at least 2 times weekly, has to take asprin to relieve pain. No meds at this time, no Selenium.

## 2018-10-02 NOTE — PROGRESS NOTES
"   1. 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 Diaz.  2. High TSH and low T3/T4- has seen endocrinology at [facility] but she tells us that their recommendations were not clear.  Unclear whether she requires treatment for hyperthyroidism.  Will refer to [facility] endocrinology.    HPI: Vania Gee is a 48 year old year old female who presents for 8 months f/u of thyroid eye disease.  She initially noticed eye symptoms about 2016.  Her thyroid disease is complicated and at this time, she has not consistently been on any therapy.      Over the past 6 months or so, her eyes have been stable. She is bothered by fatigue and discomfort primarily around the right eye (ptotic and has to lift up her brow) and is bothered that her left upper eyelid is too high.    Thyroid history:     Diagnosed when? \"hyperthyroidism\" May or June of 2016.     DEWARDS: n/a     Thyroidectomy: n/a       TSI (date): <1.0  (Feb of 2018)        Eye symptoms (since when): 2016    Proptosis (better/worse/same since last visit): same     Diplopia(better/worse/same since last visit): none     Eyelid retraction(better/worse/same since last visit): same     Tearing(better/worse/same since last visit): none    Redness (better/worse/same since last visit): none    Pain ((better/worse/same since last visit): none    Pain to move the eyes (better/worse/same since last visit): none    Blurred vision: left appears blurry         Ocular history:     Orbital decompression (date, details): none    Strabismus surgery (date, details): none    Eyelid surgery (date, details): none        Exam:     Prabhakar (base):94    Better/worse same: 16/18    Strabismus (better/worse/same): none     Eyelid retraction (better/worse/same):       Her TSH 8/20/18 was 4.23, T3 at 2.4 and T4 0.87. Has been following Dr. Olmos (PCP) for her thyroid. Not on medications. Last visit with her was " Jan of 2018. Next visit in Jan of 2019.      Over the past 6 months or so, her eyes have been stable. She is bothered by fatigue and discomfort primarily around the right eye (ptotic and has to lift up her brow) and is bothered that her left upper eyelid is too high.     She is not a smoker.      On examination, she has mild left proptosis with upper lid retraction, which are stable. Her disease appears to be inactive at this time.    She is interested in right upper eyelid ptosis repair and left upper eyelid retraction repair. She is bothered by the eyelid retraction with eye irritation and redness. On the right side she has to keep pulling up on the right brow so she can see well when reading and driving.     She would be interested in seeing endocrinology here for an additional opinion, was not able to make it to her last referral. she felt that [facility] endocrinology did not provide clear recommendations on medical treatment. Her TSH is high and T3/4 low. SHe denies diarrhea, feeling hot, weight loss.     We will Re-refer her to an endocrinologist here.     Dr. Diaz discussed today the risks, benefits, and alternatives of eyelid surgery versus orbital decompression.  Patient wishes to pursue eyelid surgery to attempt to make the eye appearance more symmetrical.          Complete documentation of historical and exam elements from today's encounter can be found in the full encounter summary report (not reduplicated in this progress note).  I personally obtained the chief complaint(s) and history of present illness.  I confirmed and edited as necessary the review of systems, past medical/surgical history, family history, social history, and examination findings as documented by others; and I examined the patient myself.  I personally reviewed the relevant tests, images, and reports as documented above.  I formulated and edited as necessary the assessment and plan and discussed the findings and management plan  with the patient and family.  I personally reviewed the ophthalmic test(s) associated with this encounter, agree with the interpretation(s) as documented by the resident/fellow, and have edited the corresponding report(s) as necessary.     Jose Gillespie MD      Addendum (Dr. Diaz)      Plan:  She is not interested in orbital decompression despite   Right upper eyelid ptosis repair (levator + skin + koko)  Left upper eyelid retraction repair (FTB + Skin)    Ptosis visual field right superior visual field limited to 23 and improves to 55 with manual elevation of the eyelid.   Today with Vania Gee, I reviewed the indications, risks, benefits, and alternatives of the proposed surgical procedure including, but not limited to, failure obtain the desired result  and need for additional surgery, bleeding, infection, loss of vision, loss of the eye, and the remote possibility of permanent damage to any organ system or death with the use of anesthesia.  I provided multiple opportunities for the questions, answered all questions to the best of my ability, and confirmed that my answers and my discussion were understood.   MD Scott Correa MD  Neuro-ophthalmology Fellow

## 2018-10-02 NOTE — MR AVS SNAPSHOT
After Visit Summary   10/2/2018    Vania Gee    MRN: [id number]           Patient Information     Date Of Birth          1970        Visit Information        Provider Department      10/2/2018 12:30 PM Jose Gillespie MD [facility]        Today's Diagnoses     Thyroid eye disease    -  1    Proptosis due to thyroid disorder        Eyelid retraction of left eye        Involutional ptosis, acquired, right           Follow-ups after your visit        Who to contact     Please call your clinic at [phone] to:    Ask questions about your health    Make or cancel appointments    Discuss your medicines    Learn about your test results    Speak to your doctor            Additional Information About Your Visit        [employer] Information     [employer] gives you secure access to your electronic health record. If you see a primary care provider, you can also send messages to your care team and make appointments. If you have questions, please call your primary care clinic.  If you do not have a primary care provider, please call [phone] and they will assist you.      [employer] is an electronic gateway that provides easy, online access to your medical records. With [employer], you can request a clinic appointment, read your test results, renew a prescription or communicate with your care team.     To access your existing account, please contact your [facility] or call [phone] for assistance.        Care EveryWhere ID     This is your Care EveryWhere ID. This could be used by other organizations to access your [facility] medical records  [id number]         Blood Pressure from Last 3 Encounters:   02/22/18 (!) 135/97   01/22/18 129/83   03/20/14 116/80    Weight from Last 3 Encounters:   02/22/18 67.1 kg (148 lb)   01/22/18 70.4 kg (155 lb 3.2 oz)   03/20/14 64.3 kg (141 lb 12.8 oz)              We Performed the Following     External Photos OU  (both eyes)     [facility] VF Ptosis OD     Lana-Operative Worksheet (Plastics)        Primary Care Provider Fax #    Physician No Ref-Primary [phone]       No address on file        Equal Access to Services     HONG RICCI : Hadii aad ku hadnellyemily Rissa, wavandanada luqadaha, qaybta kaalmada ben, kayode parekh markharshil german david sierra. So [facility] [phone].    ATENCIÓN: Si habla español, tiene a gomez disposición servicios gratuitos de asistencia lingüística. Llame al [phone].    We comply with applicable federal civil rights laws and [place] laws. We do not discriminate on the basis of race, color, national origin, age, disability, sex, sexual orientation, or gender identity.            Thank you!     Thank you for choosing [facility]  for your care. Our goal is always to provide you with excellent care. Hearing back from our patients is one way we can continue to improve our services. Please take a few minutes to complete the written survey that you may receive in the mail after your visit with us. Thank you!             Your Updated Medication List - Protect others around you: Learn how to safely use, store and throw away your medicines at www.disposemymeds.org.      Notice  As of 10/2/2018 11:59 PM    You have not been prescribed any medications.

## 2018-10-05 ENCOUNTER — TELEPHONE (OUTPATIENT)
Dept: OPHTHALMOLOGY | Facility: CLINIC | Age: 48
End: 2018-10-05

## 2018-10-05 NOTE — TELEPHONE ENCOUNTER
Spoke with patient to schedule surgery with Dr. Seth Diaz.    Surgery was scheduled on 12/13 at [facility]  Patient will have H&P at [facility]   Post-Op visit was scheduled on 12/20  Patient is aware a / is needed day of surgery.   Surgery packet was mailed, patient has my direct contact information for any further questions.

## 2018-10-10 ENCOUNTER — NURSE TRIAGE (OUTPATIENT)
Dept: NURSING | Facility: CLINIC | Age: 48
End: 2018-10-10

## 2018-10-10 NOTE — TELEPHONE ENCOUNTER
Patient calls asking about ear symptoms. States had a cold and sore throat within the past week. States sore throat resolved. Yesterday noted small amount of blood from Left ear. Past two days has had yellow drainage from Left ear. Denies pain. Denies fever. Denies odor to discharge.   Concerned about her insurance and asking if needs to be seen by a Provider.    Protocol and care advice reviewed.  Advised to be seen by her Provider within 24 hours. States she will consider making an appointment tomorrow.   Caller states understanding of the recommended disposition.  Advised to call back if further questions or concerns.     Bindu Ramirez RN  [facility]     Reason for Disposition    Yellow or green discharge    Additional Information    Negative: [1] Followed head or face injury AND [2] clear or bloody fluid    Negative: [1] Unexplained bleeding AND [2] lasts > 10 minutes or large amount (Exception: if a few drops of blood, continue with triage)    Negative: Fever > 103 F (39.4 C)    Negative: Patient sounds very sick or weak to the triager    Negative: Ear pain    Negative: Fever > 100.5 F (38.1 C)    Negative: Foul-smelling discharge    Negative: Cloudy - white discharge    Negative: Diabetes mellitus or weak immune system (e.g., HIV positive, cancer chemo, splenectomy, organ transplant, chronic steroids)    Protocols used: EAR - DISCHARGE-ADULT-

## 2018-10-11 ENCOUNTER — OFFICE VISIT (OUTPATIENT)
Dept: FAMILY MEDICINE | Facility: CLINIC | Age: 48
End: 2018-10-11
Payer: COMMERCIAL

## 2018-10-11 VITALS
DIASTOLIC BLOOD PRESSURE: 84 MMHG | BODY MASS INDEX: 26.26 KG/M2 | SYSTOLIC BLOOD PRESSURE: 136 MMHG | HEART RATE: 85 BPM | OXYGEN SATURATION: 98 % | HEIGHT: 65 IN | TEMPERATURE: 97.7 F | RESPIRATION RATE: 16 BRPM | WEIGHT: 157.6 LBS

## 2018-10-11 DIAGNOSIS — J01.90 ACUTE SINUSITIS WITH SYMPTOMS > 10 DAYS: Primary | ICD-10-CM

## 2018-10-11 DIAGNOSIS — H66.012 ACUTE SUPPURATIVE OTITIS MEDIA OF LEFT EAR WITH SPONTANEOUS RUPTURE OF TYMPANIC MEMBRANE, RECURRENCE NOT SPECIFIED: ICD-10-CM

## 2018-10-11 PROCEDURE — 99213 OFFICE O/P EST LOW 20 MIN: CPT | Performed by: NURSE PRACTITIONER

## 2018-10-11 NOTE — PATIENT INSTRUCTIONS
Ruptured Infected Eardrum (Adult)    The middle ear is the space behind the eardrum. You have an infection of the middle ear. This can lead to pressure that causes the eardrum to break (rupture). This may cause sudden pain. Pus or blood will drain out of the ear canal. Your hearing will also likely be affected.  The infection may be treated with antibiotics. The eardrum usually heals completely on its own. If it does not, further treatment is needed. For this reason, it s important to have a follow-up exam with an ear specialist.  Home care    Keep taking prescribed antibiotics until all of the medicine is gone. Do this even if you feel better after the first few days.    Take any other medicines as prescribed.    Keep a clean cotton ball in the ear canal to absorb drainage. Change the cotton often, when it becomes soiled with fluid drainage. Don t let water get into the ear. Don t put any medicine drops into the ear unless your healthcare provider tells you to do so.  Follow-up care  Follow up with your healthcare provider in 2 weeks, or as advised. This is to make sure the infection is getting better and the eardrum is healing. Also follow up with specialists as advised for a hearing test or exam.  When to seek medical advice  Call your healthcare provider if you have any of the following:    Fever of 100.4 F (38 C) or higher    Pain that gets worse or doesn t get better    Unusual drowsiness or confusion    Headache, neck pain or a stiff neck    Convulsions (seizure)    Worsening dizziness    Worsening hearing loss or ringing in the ear  Date Last Reviewed: 10/1/2017    [id number] [employer]. [address] [place] [address]. All rights reserved. This information is not intended as a substitute for professional medical care. Always follow your healthcare professional's instructions.        Acute Bacterial Rhinosinusitis (ABRS)    Acute bacterial rhinosinusitis (ABRS) is an infection of  your nasal cavity and sinuses. It s caused by bacteria. Acute means that you ve had symptoms for less than 4 weeks, but possibly up to 12 weeks.  Understanding your sinuses  The nasal cavity is the large air-filled space behind your nose. The sinuses are a group of spaces formed by the bones of your face. They connect with your nasal cavity. ABRS causes the tissue lining these spaces to become inflamed. Mucus may not drain normally. This leads to facial pain and other symptoms.  What causes ABRS?  ABRS most often follows an upper respiratory infection caused by a virus. Bacteria then infect the lining of your nasal cavity and sinuses. But you can also get ABRS if you have:    Nasal allergies    Long-term nasal swelling and congestion not caused by allergies    Blockage in the nose  Symptoms of ABRS  The symptoms of ABRS may be different for each person and include:    Nasal congestion or blockage    Pain or pressure in the face    Thick, colored drainage from the nose  Other symptoms may include:    Runny nose    Fluid draining from the nose down the throat (postnasal drip)    Headache    Cough    Pain    Fever  Diagnosing ABRS  ABRS may be diagnosed if you ve had an upper respiratory infection like a cold and cough for 10 or more days without improvement or with worsening symptoms. Your healthcare provider will ask about your symptoms and your medical history. The provider will check your vital signs, including your temperature. You ll have a physical exam. The healthcare provider will check your ears, nose, and throat. You likely won t need any tests. If ABRS comes back, you may have a culture or other tests.  Treatment for ABRS  Treatment may include:    Antibiotic medicine. This is for symptoms that last for at least 10 to 14 days.    Nasal corticosteroid medicine. Drops or spray used in the nose can lessen swelling and congestion.    Over-the-counter pain medicine. This is to lessen sinus pain and  pressure.    Nasal decongestant medicine. Spray or drops may help to lessen congestion. Do not use them for more than a few days.    Salt wash (saline irrigation). This can help to loosen mucus.  Possible complications of ABRS  ABRS may come back or become long-term (chronic). In rare cases, ABRS may cause complications such as:     Inflamed tissue around the brain and spinal cord (meningitis)    Inflamed tissue around the eyes (orbital cellulitis)    Inflamed bones around the sinuses (osteitis)  These problems may need to be treated in a hospital with intravenous (IV) antibiotic medicine or surgery.  When to call the healthcare provider  Call your healthcare provider if you have any of the following:    Symptoms that don t get better, or get worse    Symptoms that don t get better after 3 to 5 days on antibiotics    Trouble seeing    Swelling around your eyes    Confusion or trouble staying awake   Date Last Reviewed: 5/1/2017 2000-2017 [employer]. [address] [place] [address], [place] [id number]. All rights reserved. This information is not intended as a substitute for professional medical care. Always follow your healthcare professional's instructions.

## 2018-10-11 NOTE — PROGRESS NOTES
SUBJECTIVE:   Vania Gee is a 48 year old female who presents to clinic today for the following health issues:      ENT Symptoms             Symptoms: cc Present Absent Comment   Fever/Chills   x    Fatigue  x     Muscle Aches  x     Eye Irritation   x    Sneezing  x     Nasal Vinay/Drg  x  Purulent drainage x 4 days, clear before that   Sinus Pressure/Pain   x    Loss of smell  x     Dental pain   x    Sore Throat  x  From cough   Swollen Glands   x    Ear Pain/Fullness x x  Left ear pain, small amount of blood and clear fluid draining from ear   Cough  x  nonproductive   Wheeze   x    Chest Pain   x    Shortness of breath   x    Rash   x    Other         Symptom duration:  11 days, ear started 4 days ago   Symptom severity:  moderate to severe   Treatments tried:  advil, dayquil, mucinex, sudafed   Contacts:  daughter has cold sx       Problem list and histories reviewed & adjusted, as indicated.  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     Cervical high risk HPV (human papillomavirus) test positive     Past Surgical History:   Procedure Laterality Date     APPENDECTOMY       COLPOSCOPY CERVIX, BIOPSY CERVIX, ENDOCERVICAL CURETTAGE, COMBINED  02/22/2018    Negative     ECTOPIC PREGNANCY SURGERY         Social History   Substance Use Topics     Smoking status: Never Smoker     Smokeless tobacco: Never Used     Alcohol use Yes      Comment: 1 PER WEEK     Family History   Problem Relation Age of Onset     HEART DISEASE Mother      Thyroid Disease Mother      HEART DISEASE Father      Hypertension Father      Thyroid Disease Maternal Grandmother      HEART DISEASE Brother      PACE MAKER     Amblyopia No family hx of      Strabismus No family hx of            Reviewed and updated as needed this visit by clinical staff       Reviewed and updated as needed this visit by Provider         ROS:  Constitutional, HEENT,  "cardiovascular, pulmonary, gi and gu systems are negative, except as otherwise noted.    OBJECTIVE:     /84 (BP Location: Right arm, Patient Position: Sitting, Cuff Size: Adult Regular)  Pulse 85  Temp 97.7  F (36.5  C) (Tympanic)  Resp 16  Ht 5' 5\" (1.651 m)  Wt 157 lb 9.6 oz (71.5 kg)  SpO2 98%  BMI 26.23 kg/m2  Body mass index is 26.23 kg/(m^2).  GENERAL: healthy, alert and no distress  EYES: Eyes grossly normal to inspection, PERRL and conjunctivae and sclerae normal  HENT: normal cephalic/atraumatic, right ear: normal: no effusions, no erythema, normal landmarks, left ear: erythematous, bulging membrane, mucopurulent effusion and no visible perforation, no visible drainage in canal, nose and mouth without ulcers or lesions, oropharynx clear, oral mucous membranes moist and sinuses: maxillary tenderness on both sides  NECK: no adenopathy, no asymmetry, masses, or scars and thyroid normal to palpation  RESP: lungs clear to auscultation - no rales, rhonchi or wheezes  CV: regular rates and rhythm, normal S1 S2, no S3 or S4 and no murmur, click or rub  MS: no gross musculoskeletal defects noted, no edema  SKIN: no suspicious lesions or rashes  NEURO: Normal strength and tone, mentation intact and speech normal  PSYCH: mentation appears normal, affect normal/bright    Diagnostic Test Results:  none     ASSESSMENT/PLAN:       ICD-10-CM    1. Acute sinusitis with symptoms > 10 days J01.90 amoxicillin-clavulanate (AUGMENTIN) 875-125 MG per tablet   2. Acute suppurative otitis media of left ear with spontaneous rupture of tympanic membrane, recurrence not specified H66.012 amoxicillin-clavulanate (AUGMENTIN) 875-125 MG per tablet     OTOLARYNGOLOGY REFERRAL       FUTURE APPOINTMENTS:       - Follow up in 3-5 days for symptoms that are not improving, sooner for new or worsening sx. See ENT if persistent hearing loss in 2-3 weeks. Avoid getting water in left ear for next 2 weeks.     Patient Instructions "       Ruptured Infected Eardrum (Adult)    The middle ear is the space behind the eardrum. You have an infection of the middle ear. This can lead to pressure that causes the eardrum to break (rupture). This may cause sudden pain. Pus or blood will drain out of the ear canal. Your hearing will also likely be affected.  The infection may be treated with antibiotics. The eardrum usually heals completely on its own. If it does not, further treatment is needed. For this reason, it s important to have a follow-up exam with an ear specialist.  Home care    Keep taking prescribed antibiotics until all of the medicine is gone. Do this even if you feel better after the first few days.    Take any other medicines as prescribed.    Keep a clean cotton ball in the ear canal to absorb drainage. Change the cotton often, when it becomes soiled with fluid drainage. Don t let water get into the ear. Don t put any medicine drops into the ear unless your healthcare provider tells you to do so.  Follow-up care  Follow up with your healthcare provider in 2 weeks, or as advised. This is to make sure the infection is getting better and the eardrum is healing. Also follow up with specialists as advised for a hearing test or exam.  When to seek medical advice  Call your healthcare provider if you have any of the following:    Fever of 100.4 F (38 C) or higher    Pain that gets worse or doesn t get better    Unusual drowsiness or confusion    Headache, neck pain or a stiff neck    Convulsions (seizure)    Worsening dizziness    Worsening hearing loss or ringing in the ear  Date Last Reviewed: 10/1/2017    2302-8637 [employer]. [address] [place] [address]. All rights reserved. This information is not intended as a substitute for professional medical care. Always follow your healthcare professional's instructions.        Acute Bacterial Rhinosinusitis (ABRS)    Acute bacterial rhinosinusitis (ABRS) is an infection  of your nasal cavity and sinuses. It s caused by bacteria. Acute means that you ve had symptoms for less than 4 weeks, but possibly up to 12 weeks.  Understanding your sinuses  The nasal cavity is the large air-filled space behind your nose. The sinuses are a group of spaces formed by the bones of your face. They connect with your nasal cavity. ABRS causes the tissue lining these spaces to become inflamed. Mucus may not drain normally. This leads to facial pain and other symptoms.  What causes ABRS?  ABRS most often follows an upper respiratory infection caused by a virus. Bacteria then infect the lining of your nasal cavity and sinuses. But you can also get ABRS if you have:    Nasal allergies    Long-term nasal swelling and congestion not caused by allergies    Blockage in the nose  Symptoms of ABRS  The symptoms of ABRS may be different for each person and include:    Nasal congestion or blockage    Pain or pressure in the face    Thick, colored drainage from the nose  Other symptoms may include:    Runny nose    Fluid draining from the nose down the throat (postnasal drip)    Headache    Cough    Pain    Fever  Diagnosing ABRS  ABRS may be diagnosed if you ve had an upper respiratory infection like a cold and cough for 10 or more days without improvement or with worsening symptoms. Your healthcare provider will ask about your symptoms and your medical history. The provider will check your vital signs, including your temperature. You ll have a physical exam. The healthcare provider will check your ears, nose, and throat. You likely won t need any tests. If ABRS comes back, you may have a culture or other tests.  Treatment for ABRS  Treatment may include:    Antibiotic medicine. This is for symptoms that last for at least 10 to 14 days.    Nasal corticosteroid medicine. Drops or spray used in the nose can lessen swelling and congestion.    Over-the-counter pain medicine. This is to lessen sinus pain and  pressure.    Nasal decongestant medicine. Spray or drops may help to lessen congestion. Do not use them for more than a few days.    Salt wash (saline irrigation). This can help to loosen mucus.  Possible complications of ABRS  ABRS may come back or become long-term (chronic). In rare cases, ABRS may cause complications such as:     Inflamed tissue around the brain and spinal cord (meningitis)    Inflamed tissue around the eyes (orbital cellulitis)    Inflamed bones around the sinuses (osteitis)  These problems may need to be treated in a hospital with intravenous (IV) antibiotic medicine or surgery.  When to call the healthcare provider  Call your healthcare provider if you have any of the following:    Symptoms that don t get better, or get worse    Symptoms that don t get better after 3 to 5 days on antibiotics    Trouble seeing    Swelling around your eyes    Confusion or trouble staying awake   Date Last Reviewed: 5/1/2017 2000-2017 [employer]. [address] [place] [address]. All rights reserved. This information is not intended as a substitute for professional medical care. Always follow your healthcare professional's instructions.            JOHNNIE Hensley [facility]

## 2018-10-11 NOTE — MR AVS SNAPSHOT
After Visit Summary   10/11/2018    Vania Gee    MRN: [id number]           Patient Information     Date Of Birth          1970        Visit Information        Provider Department      10/11/2018 9:20 AM Lisset Romero APRN [facility]        Today's Diagnoses     Acute sinusitis with symptoms > 10 days    -  1    Acute suppurative otitis media of left ear with spontaneous rupture of tympanic membrane, recurrence not specified          Care Instructions      Ruptured Infected Eardrum (Adult)    The middle ear is the space behind the eardrum. You have an infection of the middle ear. This can lead to pressure that causes the eardrum to break (rupture). This may cause sudden pain. Pus or blood will drain out of the ear canal. Your hearing will also likely be affected.  The infection may be treated with antibiotics. The eardrum usually heals completely on its own. If it does not, further treatment is needed. For this reason, it s important to have a follow-up exam with an ear specialist.  Home care    Keep taking prescribed antibiotics until all of the medicine is gone. Do this even if you feel better after the first few days.    Take any other medicines as prescribed.    Keep a clean cotton ball in the ear canal to absorb drainage. Change the cotton often, when it becomes soiled with fluid drainage. Don t let water get into the ear. Don t put any medicine drops into the ear unless your healthcare provider tells you to do so.  Follow-up care  Follow up with your healthcare provider in 2 weeks, or as advised. This is to make sure the infection is getting better and the eardrum is healing. Also follow up with specialists as advised for a hearing test or exam.  When to seek medical advice  Call your healthcare provider if you have any of the following:    Fever of 100.4 F (38 C) or higher    Pain that gets worse or doesn t get better    Unusual drowsiness or  confusion    Headache, neck pain or a stiff neck    Convulsions (seizure)    Worsening dizziness    Worsening hearing loss or ringing in the ear  Date Last Reviewed: 10/1/2017    5583-8568 [employer]. [address] [place] [address], [place] [address]. All rights reserved. This information is not intended as a substitute for professional medical care. Always follow your healthcare professional's instructions.        Acute Bacterial Rhinosinusitis (ABRS)    Acute bacterial rhinosinusitis (ABRS) is an infection of your nasal cavity and sinuses. It s caused by bacteria. Acute means that you ve had symptoms for less than 4 weeks, but possibly up to 12 weeks.  Understanding your sinuses  The nasal cavity is the large air-filled space behind your nose. The sinuses are a group of spaces formed by the bones of your face. They connect with your nasal cavity. ABRS causes the tissue lining these spaces to become inflamed. Mucus may not drain normally. This leads to facial pain and other symptoms.  What causes ABRS?  ABRS most often follows an upper respiratory infection caused by a virus. Bacteria then infect the lining of your nasal cavity and sinuses. But you can also get ABRS if you have:    Nasal allergies    Long-term nasal swelling and congestion not caused by allergies    Blockage in the nose  Symptoms of ABRS  The symptoms of ABRS may be different for each person and include:    Nasal congestion or blockage    Pain or pressure in the face    Thick, colored drainage from the nose  Other symptoms may include:    Runny nose    Fluid draining from the nose down the throat (postnasal drip)    Headache    Cough    Pain    Fever  Diagnosing ABRS  ABRS may be diagnosed if you ve had an upper respiratory infection like a cold and cough for 10 or more days without improvement or with worsening symptoms. Your healthcare provider will ask about your symptoms and your medical history. The provider will check your vital signs,  including your temperature. You ll have a physical exam. The healthcare provider will check your ears, nose, and throat. You likely won t need any tests. If ABRS comes back, you may have a culture or other tests.  Treatment for ABRS  Treatment may include:    Antibiotic medicine. This is for symptoms that last for at least 10 to 14 days.    Nasal corticosteroid medicine. Drops or spray used in the nose can lessen swelling and congestion.    Over-the-counter pain medicine. This is to lessen sinus pain and pressure.    Nasal decongestant medicine. Spray or drops may help to lessen congestion. Do not use them for more than a few days.    Salt wash (saline irrigation). This can help to loosen mucus.  Possible complications of ABRS  ABRS may come back or become long-term (chronic). In rare cases, ABRS may cause complications such as:     Inflamed tissue around the brain and spinal cord (meningitis)    Inflamed tissue around the eyes (orbital cellulitis)    Inflamed bones around the sinuses (osteitis)  These problems may need to be treated in a hospital with intravenous (IV) antibiotic medicine or surgery.  When to call the healthcare provider  Call your healthcare provider if you have any of the following:    Symptoms that don t get better, or get worse    Symptoms that don t get better after 3 to 5 days on antibiotics    Trouble seeing    Swelling around your eyes    Confusion or trouble staying awake   Date Last Reviewed: 5/1/2017 2000-2017 [employer]. [address] [place] [address]. All rights reserved. This information is not intended as a substitute for professional medical care. Always follow your healthcare professional's instructions.                Follow-ups after your visit        Additional Services     OTOLARYNGOLOGY REFERRAL       Your provider has referred you to: MARI: [facility] [phone]   [email or web address]    Please be aware  that coverage of these services is subject to the terms and limitations of your health insurance plan.  Call member services at your health plan with any benefit or coverage questions.      Please bring the following with you to your appointment:    (1) Any X-Rays, CTs or MRIs which have been performed.  Contact the facility where they were done to arrange for  prior to your scheduled appointment.   (2) List of current medications  (3) This referral request   (4) Any documents/labs given to you for this referral                  Your next 10 appointments already scheduled     Dec 13, 2018   Procedure with Seth Diaz MD   [facility] ([facility])    [address] [place] [address]  5th Floor  [facility] [address] [phone]           Located in the Clinics and Surgery Center at [address] [place] [address].   parking is very convenient and highly recommended.  is a $6 flat rate fee.  Both  and self parkers should enter the main arrival plaza from [facility]; parking attendants will direct you based on your parking preference.            Dec 20, 2018 10:15 AM CST   (Arrive by 10:00 AM)   Post-Op with Seth Diaz MD   [facility] Ophthalmology ([facility])    [address] [place] [address]  4th Floor  [facility] [address] [phone]              Who to contact     If you have questions or need follow up information about today's clinic visit or your schedule please contact [facility] directly at [phone].  Normal or non-critical lab and imaging results will be communicated to you by MyChart, letter or phone within 4 business days after the clinic has received the results. If you do not hear from us within 7 days, please contact the clinic through MyChart or phone. If you have a critical or abnormal lab result, we will notify you by phone as soon as possible.  Submit refill requests  "through [employer] or call your pharmacy and they will forward the refill request to us. Please allow 3 business days for your refill to be completed.          Additional Information About Your Visit        [employer] Information     [employer] gives you secure access to your electronic health record. If you see a primary care provider, you can also send messages to your care team and make appointments. If you have questions, please call your primary care clinic.  If you do not have a primary care provider, please call [phone] and they will assist you.        Care EveryWhere ID     This is your Care EveryWhere ID. This could be used by other organizations to access your [facility] medical records  [id number]        Your Vitals Were     Pulse Temperature Respirations Height Pulse Oximetry BMI (Body Mass Index)    85 97.7  F (36.5  C) (Tympanic) 16 5' 5\" (1.651 m) 98% 26.23 kg/m2       Blood Pressure from Last 3 Encounters:   10/11/18 136/84   02/22/18 (!) 135/97   01/22/18 129/83    Weight from Last 3 Encounters:   10/11/18 157 lb 9.6 oz (71.5 kg)   02/22/18 148 lb (67.1 kg)   01/22/18 155 lb 3.2 oz (70.4 kg)              We Performed the Following     OTOLARYNGOLOGY REFERRAL          Today's Medication Changes          These changes are accurate as of 10/11/18  9:44 AM.  If you have any questions, ask your nurse or doctor.               Start taking these medicines.        Dose/Directions    amoxicillin-clavulanate 875-125 MG per tablet   Commonly known as:  AUGMENTIN   Used for:  Acute sinusitis with symptoms > 10 days, Acute suppurative otitis media of left ear with spontaneous rupture of tympanic membrane, recurrence not specified   Started by:  Lisset Romero, JOHNNIE CNP        Dose:  1 tablet   Take 1 tablet by mouth 2 times daily   Quantity:  20 tablet   Refills:  0            Where to get your medicines      These medications were sent to [facility] [address] [place], [place] - [facility] [address]   [address] [facility] [address]     Phone:  [phone]     amoxicillin-clavulanate 875-125 MG per tablet                Primary Care Provider Fax #    Physician No Ref-Primary [phone]       No address on file        Equal Access to Services     HONG RICCI : Hadii aad ku haddemond Sojuarezali, waaxda luqadaha, qaybta kaalmada adeparulda, kayode hayseliud floresharshil german david sierra. So [facility] [phone].    ATENCIÓN: Si habla español, tiene a gomez disposición servicios gratuitos de asistencia lingüística. Llame al [phone].    We comply with applicable federal civil rights laws and [place] laws. We do not discriminate on the basis of race, color, national origin, age, disability, sex, sexual orientation, or gender identity.            Thank you!     Thank you for choosing [facility]  for your care. Our goal is always to provide you with excellent care. Hearing back from our patients is one way we can continue to improve our services. Please take a few minutes to complete the written survey that you may receive in the mail after your visit with us. Thank you!             Your Updated Medication List - Protect others around you: Learn how to safely use, store and throw away your medicines at www.disposemymeds.org.          This list is accurate as of 10/11/18  9:44 AM.  Always use your most recent med list.                   Brand Name Dispense Instructions for use Diagnosis    amoxicillin-clavulanate 875-125 MG per tablet    AUGMENTIN    20 tablet    Take 1 tablet by mouth 2 times daily    Acute sinusitis with symptoms > 10 days, Acute suppurative otitis media of left ear with spontaneous rupture of tympanic membrane, recurrence not specified

## 2018-10-25 NOTE — TELEPHONE ENCOUNTER
Patient requested to have surgery in Nov if there was a cancellation.     Patient was rescheduled to 11/08 at [facility].   Post-Op rescheduled to 11/20

## 2018-11-05 ENCOUNTER — OFFICE VISIT (OUTPATIENT)
Dept: FAMILY MEDICINE | Facility: CLINIC | Age: 48
End: 2018-11-05
Payer: COMMERCIAL

## 2018-11-05 VITALS
OXYGEN SATURATION: 98 % | DIASTOLIC BLOOD PRESSURE: 78 MMHG | BODY MASS INDEX: 26.39 KG/M2 | WEIGHT: 158.6 LBS | HEART RATE: 92 BPM | TEMPERATURE: 97.8 F | SYSTOLIC BLOOD PRESSURE: 136 MMHG

## 2018-11-05 DIAGNOSIS — E05.00 GRAVES DISEASE: ICD-10-CM

## 2018-11-05 DIAGNOSIS — H02.403 PTOSIS OF BOTH EYELIDS: ICD-10-CM

## 2018-11-05 DIAGNOSIS — E05.00 PROPTOSIS DUE TO THYROID DISORDER: ICD-10-CM

## 2018-11-05 DIAGNOSIS — Z01.818 PREOP GENERAL PHYSICAL EXAM: Primary | ICD-10-CM

## 2018-11-05 LAB
T3FREE SERPL-MCNC: 2.3 PG/ML (ref 2.3–4.2)
T4 FREE SERPL-MCNC: 0.92 NG/DL (ref 0.76–1.46)
TSH SERPL DL<=0.005 MIU/L-ACNC: 2.06 MU/L (ref 0.4–4)

## 2018-11-05 PROCEDURE — 99214 OFFICE O/P EST MOD 30 MIN: CPT | Performed by: INTERNAL MEDICINE

## 2018-11-05 PROCEDURE — 84445 ASSAY OF TSI GLOBULIN: CPT | Mod: 90 | Performed by: INTERNAL MEDICINE

## 2018-11-05 PROCEDURE — 99000 SPECIMEN HANDLING OFFICE-LAB: CPT | Performed by: INTERNAL MEDICINE

## 2018-11-05 PROCEDURE — 84481 FREE ASSAY (FT-3): CPT | Performed by: INTERNAL MEDICINE

## 2018-11-05 PROCEDURE — 84443 ASSAY THYROID STIM HORMONE: CPT | Performed by: INTERNAL MEDICINE

## 2018-11-05 PROCEDURE — 36415 COLL VENOUS BLD VENIPUNCTURE: CPT | Performed by: INTERNAL MEDICINE

## 2018-11-05 PROCEDURE — 84439 ASSAY OF FREE THYROXINE: CPT | Performed by: INTERNAL MEDICINE

## 2018-11-05 NOTE — PATIENT INSTRUCTIONS
Avoid ibuprofen for 1 day prior to surgery, avoid naproxen for 3 days prior, and avoid products containing aspirin for 1 week before surgery.  Tylenol is okay to take for pain if needed.      Before Your Surgery      Call your surgeon if there is any change in your health. This includes signs of a cold or flu (such as a sore throat, runny nose, cough, rash or fever).    Do not smoke, drink alcohol or take over the counter medicine (unless your surgeon or primary care doctor tells you to) for the 24 hours before and after surgery.    If you take prescribed drugs: Follow your doctor s orders about which medicines to take and which to stop until after surgery.    Eating and drinking prior to surgery: follow the instructions from your surgeon    Take a shower or bath the night before surgery. Use the soap your surgeon gave you to gently clean your skin. If you do not have soap from your surgeon, use your regular soap. Do not shave or scrub the surgery site.  Wear clean pajamas and have clean sheets on your bed.

## 2018-11-05 NOTE — PROGRESS NOTES
[facility]  [address] [address]  [phone]  Dept: [phone]    PRE-OP EVALUATION:  Today's date: 2018    Chief Complaint   Patient presents with     Pre-Op Exam     DOS 18, [facility]      Imm/Inj     flu vaccine deferred       Vania Gee (: 1970) presents for pre-operative evaluation assessment as requested by Dr. Diaz.  She requires evaluation and anesthesia risk assessment prior to undergoing surgery/procedure for treatment of ptosis .    Proposed Surgery/ Procedure: ptosis repair, bilateral  Date of Surgery/ Procedure: 18  Time of Surgery/ Procedure: 10 a.m.   Hospital/Surgical Facility: [facility]  Fax number for surgical facility: [phone]  Primary Physician: Shay Olmos  Type of Anesthesia Anticipated: Local with MAC    Patient has a Health Care Directive or Living Will:  NO    1. NO - Do you have a history of heart attack, stroke, stent, bypass or surgery on an artery in the head, neck, heart or legs?  2. NO - Do you ever have any pain or discomfort in your chest?  3. NO - Do you have a history of  Heart Failure?  4. NO - Are you troubled by shortness of breath when: walking on the level, up a slight hill or at night?  5. NO - Do you currently have a cold, bronchitis or other respiratory infection?  6. NO - Do you have a cough, shortness of breath or wheezing?  7. NO - Do you sometimes get pains in the calves of your legs when you walk?  8. NO - Do you or anyone in your family have previous history of blood clots?  9. NO - Do you or does anyone in your family have a serious bleeding problem such as prolonged bleeding following surgeries or cuts?  10. NO - Have you ever had problems with anemia or been told to take iron pills?  11. NO - Have you had any abnormal blood loss such as black, tarry or bloody stools, or abnormal vaginal bleeding?  12. NO - Have you ever had a blood transfusion?  13. NO - Have you  or any of your relatives ever had problems with anesthesia?  14. NO - Do you have sleep apnea, excessive snoring or daytime drowsiness?  15. NO - Do you have any prosthetic heart valves?  16. NO - Do you have prosthetic joints?  17. NO - Is there any chance that you may be pregnant?      HPI:     HPI related to upcoming procedure:  Vania has a history of proptosis due to Grave's disease and is going to be having bilateral eyelid surgery to correct ptosis and make eyelids more symmetric.  She is otherwise feeling well.  She would like to have her thyroid levels and TSI level checked today.       See problem list for active medical problems.  Problems all longstanding and stable, except as noted/documented.  See ROS for pertinent symptoms related to these conditions.                                                                                                                                                          .    MEDICAL HISTORY:     Patient Active Problem List    Diagnosis Date Noted     Graves disease 01/22/2018     Priority: Medium     Cervical high risk HPV (human papillomavirus) test positive 01/22/2018     Priority: Medium     1/22/18 NIL pap, + HR HPV # 18 (no 16 or other). Plan: colp   2/22/18 [facility] Bx [facility] ECC - Negative. Polyp - normal endocervical tissue. Plan cotest in 1 year.        Proptosis due to thyroid disorder 01/10/2017     Priority: Medium      Past Medical History:   Diagnosis Date     Cervical high risk HPV (human papillomavirus) test positive 1/22/2018 1/22/18 NIL pap, + HR HPV # 18 (no 16 or other). Plan: colp      Graves disease 1/22/2018     Past Surgical History:   Procedure Laterality Date     APPENDECTOMY       COLPOSCOPY CERVIX, BIOPSY CERVIX, ENDOCERVICAL CURETTAGE, COMBINED  02/22/2018    Negative     ECTOPIC PREGNANCY SURGERY       No current outpatient prescriptions on file.     OTC products: NSAIDS rarely    No Known Allergies   Latex Allergy: NO    Social History    Substance Use Topics     Smoking status: Never Smoker     Smokeless tobacco: Never Used     Alcohol use Yes      Comment: 1 PER WEEK     History   Drug Use No       REVIEW OF SYSTEMS:   Constitutional, neuro, ENT, endocrine, pulmonary, cardiac, gastrointestinal, genitourinary, musculoskeletal, integument and psychiatric systems are negative, except as otherwise noted.    EXAM:   /78  Pulse 92  Temp 97.8  F (36.6  C)  Wt 158 lb 9.6 oz (71.9 kg)  SpO2 98%  BMI 26.39 kg/m2      GENERAL APPEARANCE: healthy, alert and no distress    EYE:  Asymmetric eyelids     HENT: normal ear canals and TMs, nose and mouth without ulcers or lesions     NECK: no adenopathy, no asymmetry, masses, or scars     RESP: lungs clear to auscultation - no rales, rhonchi or wheezes     CV: regular rates and rhythm, normal S1 S2, no S3 or S4 and no murmur, click or rub -     ABDOMEN:  soft, nontender, no HSM or masses and bowel sounds normal     MS: extremities normal- no gross deformities noted, no evidence of inflammation in joints       NEURO: mentation intact and speech normal     PSYCH: mentation appears normal. and affect normal/bright     LYMPHATICS: No cervical or supraclavicular nodes     DIAGNOSTICS:     Labs Resulted Today:   Results for orders placed or performed in visit on 11/05/18   TSH   Result Value Ref Range    TSH 2.06 0.40 - 4.00 mU/L   T4 FREE   Result Value Ref Range    T4 Free 0.92 0.76 - 1.46 ng/dL   T3, Free   Result Value Ref Range    Free T3 2.3 2.3 - 4.2 pg/mL       No results for input(s): HGB, PLT, INR, NA, POTASSIUM, CR, A1C in the last 62662 hours.     IMPRESSION:   Reason for surgery/procedure: Right ptosis repair and left lit retraction repair  Diagnosis/reason for consult: Pre-op eval    The proposed surgical procedure is considered LOW risk.    REVISED CARDIAC RISK INDEX  The patient has the following serious cardiovascular risks for perioperative complications such as (MI, PE, VFib and 3  AV  Block):  No serious cardiac risks  INTERPRETATION: 0 risks: Class I (very low risk - 0.4% complication rate)    The patient has the following additional risks for perioperative complications:  No identified additional risks      ICD-10-CM    1. Preop general physical exam Z01.818 TSH     T4 FREE     T3, Free     Thyroid stimulating immunoglobulin   2. Ptosis of both eyelids H02.403 TSH     T4 FREE     T3, Free     Thyroid stimulating immunoglobulin   3. Proptosis due to thyroid disorder E05.00 TSH     T4 FREE     T3, Free     Thyroid stimulating immunoglobulin   4. Graves disease E05.00 TSH     T4 FREE     T3, Free     Thyroid stimulating immunoglobulin       RECOMMENDATIONS:       Cardiovascular Risk  Performs 4 METs exercise without symptoms (Climb a flight of stairs) .       --Patient is to take all scheduled medications on the day of surgery EXCEPT for modifications listed below.    Anticoagulant or Antiplatelet Medication Use  NSAIDS: Ibuprofen (Motrin):         Stop one day prior to surgery        APPROVAL GIVEN to proceed with proposed procedure, without further diagnostic evaluation       Signed Electronically by: Shay Olmos MD    Copy of this evaluation report is provided to requesting physician.    [facility] Preop Guidelines    Revised Cardiac Risk Index

## 2018-11-05 NOTE — MR AVS SNAPSHOT
After Visit Summary   11/5/2018    Vania Gee    MRN: [id number]           Patient Information     Date Of Birth          1970        Visit Information        Provider Department      11/5/2018 8:20 AM Shay Olmos MD [facility]        Today's Diagnoses     Preop general physical exam    -  1    Ptosis of both eyelids        Proptosis due to thyroid disorder        Graves disease          Care Instructions    Avoid ibuprofen for 1 day prior to surgery, avoid naproxen for 3 days prior, and avoid products containing aspirin for 1 week before surgery.  Tylenol is okay to take for pain if needed.      Before Your Surgery      Call your surgeon if there is any change in your health. This includes signs of a cold or flu (such as a sore throat, runny nose, cough, rash or fever).    Do not smoke, drink alcohol or take over the counter medicine (unless your surgeon or primary care doctor tells you to) for the 24 hours before and after surgery.    If you take prescribed drugs: Follow your doctor s orders about which medicines to take and which to stop until after surgery.    Eating and drinking prior to surgery: follow the instructions from your surgeon    Take a shower or bath the night before surgery. Use the soap your surgeon gave you to gently clean your skin. If you do not have soap from your surgeon, use your regular soap. Do not shave or scrub the surgery site.  Wear clean pajamas and have clean sheets on your bed.           Follow-ups after your visit        Your next 10 appointments already scheduled     Nov 08, 2018   Procedure with Seth Diaz MD   [facility] ([facility])    [address] [place] [address]  [address] [place] [id number] [phone]           Located in the Clinics and Surgery Center at [address] [place] [address].   parking is very convenient and highly recommended.  is a $6 flat  rate fee.  Both  and self parkers should enter the main arrival plaza from [facility]; parking attendants will direct you based on your parking preference.            Nov 20, 2018  1:45 PM CST   (Arrive by 1:30 PM)   Post-Op with Seth Diaz MD   [facility] ([facility])    [address] [facility]  4th Floor  [facility] [address] [phone]              Who to contact     If you have questions or need follow up information about today's clinic visit or your schedule please contact [facility] directly at [phone].  Normal or non-critical lab and imaging results will be communicated to you by MyChart, letter or phone within 4 business days after the clinic has received the results. If you do not hear from us within 7 days, please contact the clinic through ClarityAdt or phone. If you have a critical or abnormal lab result, we will notify you by phone as soon as possible.  Submit refill requests through vitalclip or call your pharmacy and they will forward the refill request to us. Please allow 3 business days for your refill to be completed.          Additional Information About Your Visit        vitalclip Information     vitalclip gives you secure access to your electronic health record. If you see a primary care provider, you can also send messages to your care team and make appointments. If you have questions, please call your primary care clinic.  If you do not have a primary care provider, please call [phone] and they will assist you.        Care EveryWhere ID     This is your Care EveryWhere ID. This could be used by other organizations to access your [place] medical records  [id number]         Blood Pressure from Last 3 Encounters:   10/11/18 136/84   02/22/18 (!) 135/97   01/22/18 129/83    Weight from Last 3 Encounters:   10/11/18 157 lb 9.6 oz (71.5 kg)   02/22/18 148 lb (67.1 kg)   01/22/18 155 lb 3.2 oz (70.4 kg)              We  Performed the Following     T3, Free     T4 FREE     Thyroid stimulating immunoglobulin     TSH          Today's Medication Changes          These changes are accurate as of 11/5/18  8:40 AM.  If you have any questions, ask your nurse or doctor.               Stop taking these medicines if you haven't already. Please contact your care team if you have questions.     amoxicillin-clavulanate 875-125 MG per tablet   Commonly known as:  AUGMENTIN   Stopped by:  Shay Olmos MD                    Primary Care Provider Office Phone # Fax #    Shay Olmos [phone] [address] [facility] [address]        Equal Access to Services     [facility]: Hadii rodrigo cabrera hadasho Sonegin, waaxda luqadaha, qaybta kaalmada adeveto, kayode hernandez . So [facility] [place] [phone].    ATENCIÓN: Si habla español, tiene a gomez disposición servicios gratuitos de asistencia lingüística. [facility] [phone].    We comply with applicable federal civil rights laws and [place] laws. We do not discriminate on the basis of race, color, national origin, age, disability, sex, sexual orientation, or gender identity.            Thank you!     Thank you for choosing [facility]  for your care. Our goal is always to provide you with excellent care. Hearing back from our patients is one way we can continue to improve our services. Please take a few minutes to complete the written survey that you may receive in the mail after your visit with us. Thank you!             Your Updated Medication List - Protect others around you: Learn how to safely use, store and throw away your medicines at www.disposemymeds.org.      Notice  As of 11/5/2018  8:40 AM    You have not been prescribed any medications.

## 2018-11-07 ENCOUNTER — ANESTHESIA EVENT (OUTPATIENT)
Dept: SURGERY | Facility: AMBULATORY SURGERY CENTER | Age: 48
End: 2018-11-07

## 2018-11-07 LAB — TSI SER-ACNC: <1 TSI INDEX

## 2018-11-08 ENCOUNTER — SURGERY (OUTPATIENT)
Age: 48
End: 2018-11-08

## 2018-11-08 ENCOUNTER — ANESTHESIA (OUTPATIENT)
Dept: SURGERY | Facility: AMBULATORY SURGERY CENTER | Age: 48
End: 2018-11-08

## 2018-11-08 ENCOUNTER — HOSPITAL ENCOUNTER (OUTPATIENT)
Facility: AMBULATORY SURGERY CENTER | Age: 48
End: 2018-11-08
Attending: OPHTHALMOLOGY
Payer: COMMERCIAL

## 2018-11-08 VITALS
DIASTOLIC BLOOD PRESSURE: 88 MMHG | TEMPERATURE: 98 F | SYSTOLIC BLOOD PRESSURE: 150 MMHG | RESPIRATION RATE: 14 BRPM | WEIGHT: 158.9 LBS | OXYGEN SATURATION: 100 % | BODY MASS INDEX: 26.48 KG/M2 | HEIGHT: 65 IN

## 2018-11-08 DIAGNOSIS — Z98.890 POSTOPERATIVE EYE STATE: Primary | ICD-10-CM

## 2018-11-08 LAB
HCG UR QL: NEGATIVE
INTERNAL QC OK POCT: YES

## 2018-11-08 RX ORDER — LIDOCAINE 40 MG/G
CREAM TOPICAL
Status: DISCONTINUED | OUTPATIENT
Start: 2018-11-08 | End: 2018-11-08 | Stop reason: HOSPADM

## 2018-11-08 RX ORDER — ONDANSETRON 2 MG/ML
4 INJECTION INTRAMUSCULAR; INTRAVENOUS EVERY 30 MIN PRN
Status: DISCONTINUED | OUTPATIENT
Start: 2018-11-08 | End: 2018-11-09 | Stop reason: HOSPADM

## 2018-11-08 RX ORDER — KETOROLAC TROMETHAMINE 30 MG/ML
30 INJECTION, SOLUTION INTRAMUSCULAR; INTRAVENOUS EVERY 6 HOURS PRN
Status: DISCONTINUED | OUTPATIENT
Start: 2018-11-08 | End: 2018-11-09 | Stop reason: HOSPADM

## 2018-11-08 RX ORDER — FENTANYL CITRATE 50 UG/ML
25-50 INJECTION, SOLUTION INTRAMUSCULAR; INTRAVENOUS
Status: DISCONTINUED | OUTPATIENT
Start: 2018-11-08 | End: 2018-11-08 | Stop reason: HOSPADM

## 2018-11-08 RX ORDER — NEOMYCIN SULFATE, POLYMYXIN B SULFATE AND DEXAMETHASONE 3.5; 10000; 1 MG/ML; [USP'U]/ML; MG/ML
SUSPENSION/ DROPS OPHTHALMIC
Qty: 1 BOTTLE | Refills: 0 | Status: SHIPPED | OUTPATIENT
Start: 2018-11-08 | End: 2019-04-12

## 2018-11-08 RX ORDER — FENTANYL CITRATE 50 UG/ML
INJECTION, SOLUTION INTRAMUSCULAR; INTRAVENOUS PRN
Status: DISCONTINUED | OUTPATIENT
Start: 2018-11-08 | End: 2018-11-08

## 2018-11-08 RX ORDER — ERYTHROMYCIN 5 MG/G
OINTMENT OPHTHALMIC PRN
Status: DISCONTINUED | OUTPATIENT
Start: 2018-11-08 | End: 2018-11-08 | Stop reason: HOSPADM

## 2018-11-08 RX ORDER — MEPERIDINE HYDROCHLORIDE 25 MG/ML
12.5 INJECTION INTRAMUSCULAR; INTRAVENOUS; SUBCUTANEOUS
Status: DISCONTINUED | OUTPATIENT
Start: 2018-11-08 | End: 2018-11-09 | Stop reason: HOSPADM

## 2018-11-08 RX ORDER — PROPOFOL 10 MG/ML
INJECTION, EMULSION INTRAVENOUS PRN
Status: DISCONTINUED | OUTPATIENT
Start: 2018-11-08 | End: 2018-11-08

## 2018-11-08 RX ORDER — OXYCODONE HYDROCHLORIDE 5 MG/1
5 TABLET ORAL EVERY 4 HOURS PRN
Status: DISCONTINUED | OUTPATIENT
Start: 2018-11-08 | End: 2018-11-09 | Stop reason: HOSPADM

## 2018-11-08 RX ORDER — ONDANSETRON 4 MG/1
4 TABLET, ORALLY DISINTEGRATING ORAL EVERY 30 MIN PRN
Status: DISCONTINUED | OUTPATIENT
Start: 2018-11-08 | End: 2018-11-09 | Stop reason: HOSPADM

## 2018-11-08 RX ORDER — NALOXONE HYDROCHLORIDE 0.4 MG/ML
.1-.4 INJECTION, SOLUTION INTRAMUSCULAR; INTRAVENOUS; SUBCUTANEOUS
Status: DISCONTINUED | OUTPATIENT
Start: 2018-11-08 | End: 2018-11-09 | Stop reason: HOSPADM

## 2018-11-08 RX ORDER — SODIUM CHLORIDE, SODIUM LACTATE, POTASSIUM CHLORIDE, CALCIUM CHLORIDE 600; 310; 30; 20 MG/100ML; MG/100ML; MG/100ML; MG/100ML
INJECTION, SOLUTION INTRAVENOUS CONTINUOUS
Status: DISCONTINUED | OUTPATIENT
Start: 2018-11-08 | End: 2018-11-08 | Stop reason: HOSPADM

## 2018-11-08 RX ORDER — ACETAMINOPHEN 325 MG/1
975 TABLET ORAL ONCE
Status: COMPLETED | OUTPATIENT
Start: 2018-11-08 | End: 2018-11-08

## 2018-11-08 RX ORDER — TETRACAINE HYDROCHLORIDE 5 MG/ML
SOLUTION OPHTHALMIC PRN
Status: DISCONTINUED | OUTPATIENT
Start: 2018-11-08 | End: 2018-11-08 | Stop reason: HOSPADM

## 2018-11-08 RX ORDER — LIDOCAINE HYDROCHLORIDE 20 MG/ML
INJECTION, SOLUTION INFILTRATION; PERINEURAL PRN
Status: DISCONTINUED | OUTPATIENT
Start: 2018-11-08 | End: 2018-11-08

## 2018-11-08 RX ORDER — ERYTHROMYCIN 5 MG/G
OINTMENT OPHTHALMIC
Qty: 3.5 G | Refills: 0 | Status: SHIPPED | OUTPATIENT
Start: 2018-11-08 | End: 2019-04-12

## 2018-11-08 RX ORDER — SODIUM CHLORIDE, SODIUM LACTATE, POTASSIUM CHLORIDE, CALCIUM CHLORIDE 600; 310; 30; 20 MG/100ML; MG/100ML; MG/100ML; MG/100ML
INJECTION, SOLUTION INTRAVENOUS CONTINUOUS
Status: DISCONTINUED | OUTPATIENT
Start: 2018-11-08 | End: 2018-11-09 | Stop reason: HOSPADM

## 2018-11-08 RX ADMIN — ERYTHROMYCIN 1 INCH: 5 OINTMENT OPHTHALMIC at 11:44

## 2018-11-08 RX ADMIN — LIDOCAINE HYDROCHLORIDE 50 MG: 20 INJECTION, SOLUTION INFILTRATION; PERINEURAL at 11:28

## 2018-11-08 RX ADMIN — TETRACAINE HYDROCHLORIDE 1 DROP: 5 SOLUTION OPHTHALMIC at 11:50

## 2018-11-08 RX ADMIN — TETRACAINE HYDROCHLORIDE 1 DROP: 5 SOLUTION OPHTHALMIC at 11:44

## 2018-11-08 RX ADMIN — FENTANYL CITRATE 25 MCG: 50 INJECTION, SOLUTION INTRAMUSCULAR; INTRAVENOUS at 11:50

## 2018-11-08 RX ADMIN — SODIUM CHLORIDE, SODIUM LACTATE, POTASSIUM CHLORIDE, CALCIUM CHLORIDE: 600; 310; 30; 20 INJECTION, SOLUTION INTRAVENOUS at 10:36

## 2018-11-08 RX ADMIN — FENTANYL CITRATE 25 MCG: 50 INJECTION, SOLUTION INTRAMUSCULAR; INTRAVENOUS at 11:24

## 2018-11-08 RX ADMIN — PROPOFOL 45 MG: 10 INJECTION, EMULSION INTRAVENOUS at 11:34

## 2018-11-08 RX ADMIN — PROPOFOL 20 MG: 10 INJECTION, EMULSION INTRAVENOUS at 11:36

## 2018-11-08 RX ADMIN — FENTANYL CITRATE 25 MCG: 50 INJECTION, SOLUTION INTRAMUSCULAR; INTRAVENOUS at 11:55

## 2018-11-08 RX ADMIN — FENTANYL CITRATE 25 MCG: 50 INJECTION, SOLUTION INTRAMUSCULAR; INTRAVENOUS at 11:46

## 2018-11-08 RX ADMIN — ACETAMINOPHEN 975 MG: 325 TABLET ORAL at 10:35

## 2018-11-08 NOTE — ANESTHESIA POSTPROCEDURE EVALUATION
Anesthesia POST Procedure Evaluation    Patient: Vania Gee   MRN:     [id number] Gender:   female   Age:    48 year old :      1970        Preoperative Diagnosis: Thyroid Eye Disease   Procedure(s):  Right ptosis repair  Left retraction repair   Postop Comments: No value filed.       Anesthesia Type:  MAC    Reportable Event: NO     PAIN: Uncomplicated   Sign Out status: Comfortable, Well controlled pain     PONV: No PONV   Sign Out status:  No Nausea or Vomiting     Neuro/Psych: Uneventful perioperative course   Sign Out Status: Preoperative baseline; Age appropriate mentation     Airway/Resp.: Uneventful perioperative course   Sign Out Status: Non labored breathing, age appropriate RR; Resp. Status within EXPECTED Parameters     CV: Uneventful perioperative course   Sign Out status: Appropriate BP and perfusion indices; Appropriate HR/Rhythm     Disposition:   Sign Out in:  Phase II  Disposition:  Home  Recovery Course: Uneventful  Follow-Up: Not required           Last Anesthesia Record Vitals:  CRNA VITALS  2018 1146 - 2018 1246      2018             Pulse: 68    SpO2: 100 %    Resp Rate (set): 10          Last PACU/Preop Vitals:  Vitals:    18 1014 18 1222   BP: 118/86 150/88   Resp: 16 14   Temp: 36.5  C (97.7  F) 36.7  C (98  F)   SpO2: 100% 100%         Electronically Signed By: Rosalio Cabral MD, 2018

## 2018-11-08 NOTE — IP AVS SNAPSHOT
MRN:[id number]                      After Visit Summary   11/8/2018    Vania Gee    MRN: [id number]           Thank you!     Thank you for choosing [facility] for your care. Our goal is always to provide you with excellent care. Hearing back from our patients is one way we can continue to improve our services. Please take a few minutes to complete the written survey that you may receive in the mail after you visit with us. Thank you!        Patient Information     Date Of Birth          1970        About your hospital stay     You were admitted on:  November 8, 2018 You last received care in the:  [facility]    You were discharged on:  November 8, 2018       Who to Call     For medical emergencies, please call 911.  For non-urgent questions about your medical care, please call your primary care provider or clinic, [phone]  For questions related to your surgery, please call your surgery clinic        Attending Provider     Provider Seth Ledbetter MD Ophthalmology       Primary Care Provider Office Phone # Fax #    MoiraSamantha Olmos [phone]      After Care Instructions     Discharge Medication Instructions       Do NOT take aspirin or medications containing NSAIDS for 72 hours after procedure.            Ice to affected area       Apply cold pack for 15 minutes on, 15 minutes off, for 48 hours while awake.                  Your next 10 appointments already scheduled     Nov 20, 2018  1:45 PM CST   (Arrive by 1:30 PM)   Post-Op with Seth Diaz MD   [facility] ([facility])    [address] [place] [id number] [phone]              Further instructions from your care team       [facility]  Home Care Following Anesthesia  For 24 hours after surgery:  1. Get plenty of rest.  A responsible adult must stay with you for at  least 24 hours after you leave the surgery center.  2. Do not drive or use heavy equipment.  If you have weakness or tingling, don't drive or use heavy equipment until this feeling goes away.   3. Do not drink alcohol.   4. Avoid strenuous or risky activities.  Ask for help when climbing stairs.  5. You may feel lightheaded.  IF so, sit for a few minutes before standing.  Have someone help you get up.   6. If you have nausea (feel sick to your stomach): Drink only clear liquids such as apple juice, ginger ale, broth or 7-Up.  Rest may also help.  Be sure to drink enough fluids.  Move to a regular diet as you feel able.   7. You may have a slight fever.  Call the doctor if your fever is over 100 F (37.7 C) (taken under the tongue) or lasts longer than 24 hours.  8. You may have a dry mouth, a sore throat, muscle aches or trouble sleeping. These should go away after 24 hours.  9. Do not make important or legal decisions.               Tips for taking pain medications  To get the best pain relief possible, remember these points:    Take pain medications as directed, before pain becomes severe.    Pain medication can upset your stomach: taking it with food may help.    Constipation is a common side effect of pain medication. Drink plenty of  fluids.    Eat foods high in fiber. Take a stool softener if recommended by your doctor or pharmacist.    Do not drink alcohol, drive or operate machinery while taking pain medications.    Ask about other ways to control pain, such as with heat, ice or relaxation.    Tylenol/Acetaminophen Consumption  To help encourage the safe use of acetaminophen, the makers of TYLENOL  have lowered the maximum daily dose for single-ingredient Extra Strength TYLENOL  (acetaminophen) products sold in the [place] from 8 pills per day (4,000 mg) to 6 pills per day (3,000 mg). The dosing interval has also changed from 2 pills every 4-6 hours to 2 pills every 6 hours.    If you feel your pain relief is  insufficient, you may take Tylenol/Acetaminophen in addition to your narcotic pain medication.     Be careful not to exceed 3,000 mg of Tylenol/Acetaminophen in a 24 hour period from all sources.    If you are taking extra strength Tylenol/acetaminophen (500 mg), the maximum dose is 6 tablets in 24 hours.    If you are taking regular strength acetaminophen (325 mg), the maximum dose is 9 tablets in 24 hours.    Call a doctor for any of the followin. Signs of infection (fever, growing tenderness at the surgery site, a large amount of drainage or bleeding, severe pain, foul-smelling drainage, redness, swelling).  2. It has been over 8 to 10 hours since surgery and you are still not able to urinate (pass water).  3. Headache for over 24 hours.  4. Numbness, tingling or weakness the day after surgery (if you had spinal anesthesia).  Your doctor is:       Dr. Seth Diaz, Ophthalmology: [phone]               Or dial [phone] and ask for the resident on call for:  Ophthalmology  For emergency care, call the:  [facility]:  [phone] (TTY for hearing impaired: [phone])            Post-operative Instructions  Ophthalmic Plastic and Reconstructive Surgery    Seth Diaz M.D.     All instructions apply to the operated eye(s) or eyelid(s).    Wound care and personal care  ? If a patch or bandage has been placed, please leave this in place until seen by your physician. Ensure that the bandage does not get wet when you take a shower.  ? Apply ice compresses 15 minutes on 15 minutes off while awake for 2 days, then switch to warm water compresses 4 times a day until seen by your physician. For warm packs you can place a cup of dry uncooked rice in a clean cotton sock. Then place sock in microwave 30 seconds to one minute. Next place the warm sock into a plastic bag and wrap the bag with clean warm wet washcloth and place over operated eye.    ? You may shower or wash your hair  the day after surgery. Do not bathe or go swimming for 1 week to prevent contamination of your wounds.  ? Do not apply make-up to the eyes or eyelids for 2 weeks after surgery.  ? Expect some swelling, bruising, black eye (even into the lower eyelids and cheeks). Also expect serum caking, crusting and discharge from the eye and/or incisions. A small amount of surface bleeding is normal for the first 48 hours.  ? Your eye(s) and eyelid(s) may be painful and tender. This is normal after surgery.      Contact information and follow-up  ? Return to the Eye Clinic for a follow-up appointment with your physician as  scheduled. If no appointment has been scheduled:   - [facility] eye clinic: [phone] for an appointment with Dr. Diaz within 1 to 2 weeks from your date of surgery.   -  [facility] eye clinic: [phone] for an appointment with Dr. Diaz within 1 to 2 weeks from your date of surgery.     ? For severe pain, bleeding, or loss of vision, call the [facility] at [phone] or [facility] at [phone].     After hours or on weekends and holidays, call [phone] and ask to speak with the ophthalmologist on call.    An on call person can be reached after hours for concerns. The on call doctor should not call in medication refill requests after hours or on weekends, so please plan accordingly. An effort has been made to provide adequate pain medications following every surgery, and refills will not be provided in most instances. Narcotic pain medications cannot be called in.     Activity restrictions and driving  ? Avoid heavy lifting, bending, exercise or strenuous activity for 1 week after surgery.  You may resume other activities and return to work as tolerated.  ? You may not resume driving until have you stopped using narcotic pain medications (such as Norco, Percocet, Tylenol #3).    Medications  ? Restart all your regular  "home medications and eye drops. If you take Plavix or  Aspirin on a regular basis, wait for 72 hours after your surgery before restarting these in order to decrease the risk of bleeding complications.  ? Avoid aspirin and aspirin-like medications (Motrin, Aleve, Ibuprofen, Keyla-  [place] etc) for 72 hours to reduce the risk of bleeding. You may take Tylenol  (acetaminophen) for pain.  ? In addition to your home medications, take the following post-operative medications as prescribed by your physician.    ? Apply antibiotic ointment to all sutures three times a day, and into both eyes at night.  ? Instill eye drops 3 times a day in both eyes for 10 days.     ? WARNING:  You must not take more than 4,000 mg of acetaminophen per  24-hour period.       Pending Results     No orders found from 11/6/2018 to 11/9/2018.            Admission Information     Date & Time Provider Department Dept. Phone    11/8/2018 Seth Diaz MD [facility] [phone]      Your Vitals Were     Blood Pressure Temperature Respirations Height Weight Last Period    150/88 98  F (36.7  C) (Axillary) 14 1.651 m (5' 5\") 72.1 kg (158 lb 14.4 oz) 10/25/2018 (Approximate)    Pulse Oximetry BMI (Body Mass Index)                100% 26.44 kg/m2          SendRR Information     SendRR gives you secure access to your electronic health record. If you see a primary care provider, you can also send messages to your care team and make appointments. If you have questions, please call your primary care clinic.  If you do not have a primary care provider, please call [phone] and they will assist you.      SendRR is an electronic gateway that provides easy, online access to your medical records. With SendRR, you can request a clinic appointment, read your test results, renew a prescription or communicate with your care team.     To access your existing account, please contact your [facility] " or call [phone] for assistance.        Care EveryWhere ID     This is your Care EveryWhere ID. This could be used by other organizations to access your [facility] medical records  [id number]        Equal Access to Services     HONG RICCI : Hadii aad ku hadnellyo Sojuarezali, waaxda luqadaha, qaybta kaalmada adeveto, kayode hayseliud floresharshil german david sierra. So [facility] [phone].    ATENCIÓN: Si habla español, tiene a gomez disposición servicios gratuitos de asistencia lingüística. Llame al [phone].    We comply with applicable federal civil rights laws and [place] laws. We do not discriminate on the basis of race, color, national origin, age, disability, sex, sexual orientation, or gender identity.               Review of your medicines      START taking        Dose / Directions    erythromycin ophthalmic ointment   Commonly known as:  ROMYCIN   Used for:  Postoperative eye state        Apply small amount to incision sites three times daily, then apply to inner lower lid of operative eye(s) at bedtime, as directed.   Quantity:  3.5 g   Refills:  0       neomycin-polymyxin-dexamethasone 3.5-33805-2.1 Susp ophthalmic susp   Commonly known as:  MAXITROL   Used for:  Postoperative eye state        Instill 1 drop into operative eye(s) 3 times daily   Quantity:  1 Bottle   Refills:  0            Where to get your medicines      These medications were sent to [facility] [place], [place] - [address] [facility] [address]  [address] [place] [id number]    Hours:  TRANSPLANT PHONE NUMBER [phone] Phone:  [phone]     erythromycin ophthalmic ointment    neomycin-polymyxin-dexamethasone 3.5-71467-0.1 Susp ophthalmic susp                Protect others around you: Learn how to safely use, store and throw away your medicines at www.disposemymeds.org.             Medication List: This is a list of all your medications and when to take them. Check marks below indicate your  daily home schedule. Keep this list as a reference.      Medications           Morning Afternoon Evening Bedtime As Needed    erythromycin ophthalmic ointment   Commonly known as:  ROMYCIN   Apply small amount to incision sites three times daily, then apply to inner lower lid of operative eye(s) at bedtime, as directed.   Last time this was given:  1 inch on 11/8/2018 11:44 AM                                neomycin-polymyxin-dexamethasone 3.5-63518-5.1 Susp ophthalmic susp   Commonly known as:  MAXITROL   Instill 1 drop into operative eye(s) 3 times daily

## 2018-11-08 NOTE — ANESTHESIA CARE TRANSFER NOTE
Patient: Vania Gee    Procedure(s):  Right ptosis repair  Left retraction repair    Diagnosis: Thyroid Eye Disease  Diagnosis Additional Information: No value filed.    Anesthesia Type:   No value filed.     Note:  Airway :Room Air  Patient transferred to:Phase II  Comments: VSS/WNL. Responds well.Handoff Report: Identifed the Patient, Identified the Reponsible Provider, Reviewed the pertinent medical history, Discussed the surgical course, Reviewed Intra-OP anesthesia mangement and issues during anesthesia, Set expectations for post-procedure period and Allowed opportunity for questions and acknowledgement of understanding      Vitals: (Last set prior to Anesthesia Care Transfer)    CRNA VITALS  11/8/2018 1146 - 11/8/2018 1222      11/8/2018             Pulse: 68    SpO2: 100 %    Resp Rate (set): 10                Electronically Signed By: JOHNNIE Landa CRNA  November 8, 2018  12:22 PM

## 2018-11-08 NOTE — ANESTHESIA PREPROCEDURE EVALUATION
"Anesthesia Pre-Procedure Evaluation    Patient: Vania Gee   MRN:     [id number] Gender:   female   Age:    48 year old :      1970        Preoperative Diagnosis: Thyroid Eye Disease   Procedure(s):  REPAIR PTOSIS  REPAIR RETRACTION LID     Past Medical History:   Diagnosis Date     Cervical high risk HPV (human papillomavirus) test positive 2018 NIL pap, + HR HPV # 18 (no 16 or other). Plan: colp      Graves disease 2018      Past Surgical History:   Procedure Laterality Date     APPENDECTOMY       COLPOSCOPY CERVIX, BIOPSY CERVIX, ENDOCERVICAL CURETTAGE, COMBINED  2018    Negative     ECTOPIC PREGNANCY SURGERY                     PHYSICAL EXAM:   Mental Status/Neuro: A/A/O   Airway: Facies: Feasible  Mallampati: II  Mouth/Opening: Full  TM distance: > 6 cm  Neck ROM: Full   Respiratory: Auscultation: CTAB     Resp. Rate: Normal     Resp. Effort: Normal      CV: Rhythm: Regular  Rate: Age appropriate  Heart: Normal Sounds   Comments:                    Lab Results   Component Value Date    HGB 11.9 2007    HCT 36.0 2007    GLC 93 2018    TSH 2.06 2018    T4 0.92 2018    HCG Negative 2018       Preop Vitals  BP Readings from Last 3 Encounters:   18 118/86   18 136/78   10/11/18 136/84    Pulse Readings from Last 3 Encounters:   18 92   10/11/18 85   18 108      Resp Readings from Last 3 Encounters:   18 16   10/11/18 16    SpO2 Readings from Last 3 Encounters:   18 100%   18 98%   10/11/18 98%      Temp Readings from Last 1 Encounters:   18 36.5  C (97.7  F) (Oral)    Ht Readings from Last 1 Encounters:   18 1.651 m (5' 5\")      Wt Readings from Last 1 Encounters:   18 72.1 kg (158 lb 14.4 oz)    Estimated body mass index is 26.44 kg/(m^2) as calculated from the following:    Height as of this encounter: 1.651 m (5' 5\").    Weight as of this encounter: 72.1 kg (158 lb 14.4 oz). "     LDA:  Peripheral IV 11/08/18 Right Hand (Active)   Site Assessment WDL 11/8/2018 10:37 AM   Line Status Infusing 11/8/2018 10:37 AM   Phlebitis Scale 0-->no symptoms 11/8/2018 10:37 AM   Extravasation? No 11/8/2018 10:37 AM   Dressing Intervention New dressing  11/8/2018 10:37 AM   Number of days:0            Assessment:   ASA SCORE: 2    NPO Status: > 2 hours since completed Clear Liquids; > 6 hours since completed Solid Foods   Documentation: H&P complete; Preop Testing complete; Consents complete   Proceeding: Proceed without further delay  Tobacco Use:  NO Active use of Tobacco/UNKNOWN Tobacco use status     Plan:   Anes. Type:  MAC   Pre-Induction: Midazolam IV   Induction:  IV (Standard)   Airway: Native Airway   Access/Monitoring: PIV   Maintenance: Propofol; IV   Emergence: Procedure Site   Logistics: Same Day Surgery     Postop Pain/Sedation Strategy:  Standard-Options: Opioids PRN     PONV Management:  Adult Risk Factors: Female, Non-Smoker, Postop Opioids  Prevention: Propofol Infusion; Ondansetron; Dexamethasone     CONSENT: Direct conversation   Plan and risks discussed with: Patient                          ANESTHESIA PREOP EVALUATION    PROCEDURE: Procedure(s):  REPAIR PTOSIS  REPAIR RETRACTION LID    HPI: Vania Gee is a 48 year old female who presents for above procedure.    PAST MEDICAL HISTORY:    Past Medical History:   Diagnosis Date     Cervical high risk HPV (human papillomavirus) test positive 1/22/2018 1/22/18 NIL pap, + HR HPV # 18 (no 16 or other). Plan: colp      Graves disease 1/22/2018       PAST SURGICAL HISTORY:    Past Surgical History:   Procedure Laterality Date     APPENDECTOMY       COLPOSCOPY CERVIX, BIOPSY CERVIX, ENDOCERVICAL CURETTAGE, COMBINED  02/22/2018    Negative     ECTOPIC PREGNANCY SURGERY         PAST ANESTHESIA HISTORY:     No personal or family h/o anesthesia problems    SOCIAL HISTORY:       Social History   Substance Use Topics     Smoking status:  Never Smoker     Smokeless tobacco: Never Used     Alcohol use Yes      Comment: 1 PER WEEK       ALLERGIES:     No Known Allergies    MEDICATIONS:       (Not in a hospital admission)    No current outpatient prescriptions on file.       No current [facility]-ordered outpatient prescriptions on file.     Current Facility-Administered Medications Ordered in Epic   Medication Dose Route Frequency Provider Last Rate Last Dose     lactated ringers infusion   Intravenous Continuous Rosalio Cabral MD 25 mL/hr at 11/08/18 1036       lidocaine (LMX4) kit   Topical Q1H PRN Rosalio Cabral MD         lidocaine BUFFERED 1 % injection 1 mL  1 mL Other Q1H PRN Rosalio Cabral MD         sodium chloride (PF) 0.9% PF flush 3 mL  3 mL Intracatheter Q1H PRN Rosalio Cabral MD         sodium chloride (PF) 0.9% PF flush 3 mL  3 mL Intracatheter Q8H Rosalio Cabral MD   3 mL at 11/08/18 1036       PHYSICAL EXAM:    Vitals: T 97.7, P Data Unavailable, /86, R 16, SpO2 100%, Weight   Wt Readings from Last 2 Encounters:   11/08/18 72.1 kg (158 lb 14.4 oz)   11/05/18 71.9 kg (158 lb 9.6 oz)     See doc flowsheet    NPO STATUS: see doc flowsheet    LABS:    BMP:  Recent Labs   Lab Test  02/06/18   1658   GLC  93       LFTs:   No results for input(s): PROTTOTAL, ALBUMIN, BILITOTAL, ALKPHOS, AST, ALT, BILIDIRECT in the last 79363 hours.    CBC:   No results for input(s): WBC, RBC, HGB, HCT, MCV, MCH, MCHC, RDW, PLT in the last 33218 hours.    Coags:  No results for input(s): INR, PTT, FIBR in the last 80493 hours.    Imaging:  No orders to display       Rosalio Cabral MD  Anesthesiology Staff  Pager [phone]    11/8/2018  10:55 AM      Rosalio Cabral MD

## 2018-11-08 NOTE — OP NOTE
PREOPERATIVE DIAGNOSIS: Left upper eyelid retraction secondary to thyroid eye disease. Right upper eyelid ptosis    POSTOPERATIVE DIAGNOSIS:Left upper eyelid retraction secondary to thyroid eye disease. Right upper eyelid ptosis    PROCEDURE:Left upper eyelid recession by full thickness blepharotomy. Right upper eyelid ptosis repair with levator advancement.     SURGEON: Seth Diaz MD    ASSISTANT: Susan Jones MD    ANESTHESIA: Monitored with local infiltration of a 50/50 mixture of 2% lidocaine with epinephrine and 0.5% Marcaine.     COMPLICATIONS: None.     ESTIMATED BLOOD LOSS: 2 mL.     HISTORY: Vania Gee  presented with retraction of theleft upper lid with exposure keratopathy. She also had significant right upper eyelid ptosis, which was found to be obstructing her superior visual field.   After risks, benefits and alternatives to the proposed procedure were explained, informed consent was obtained.     DESCRIPTION OF PROCEDURE: Vania Gee  was brought to the operating room and placed supine on the operating table. IV sedation was given. The upper lid crease and excess eyelid skin was marked with a marking pen and infiltrated with local anesthetic. The area was prepped and draped in the typical fashion. Attention was directed to the left side. A scleral shield was placed over the eye. A #15 blade was used to incise the skin following the marked line. Excess skin was excised with the high temperature cautery.  Dissection was carried down to the orbicularis with high temperature cautery. Orbital septum was opened horizontally. Dissection was carried to the superior tarsal plate. Levator aponeurosis, Rodriguez's muscle and conjunctiva were released from the superior tarsal border nasally and temporally until the lid came to a normal height. Laterally a full thickness blepharotomy was performed, while centrally and nasally conjunctiva was left intact. The lid came into an excellent position  after removal of the scleral shield. Again, the position was checked. Attention was directed to the right side where the marked skin was incised with a #15 blade, and the skin flap was excised with thermal cautery. The orbicularis and orbital septum were opened with cautery. Laterally just the orbicularis was opened. Laterally, dissection was carried superiorly in a preseptal plane over the orbital rim. The superior lip of orbicularis was the secured to the orbital rim nicely supporting the tail of the brow, and providing anterior projection using 5-0 chromic. A second 5-0 chromic was placed adjacent to this. The levator aponeurosis was then identified and dissected off of the tarsal plate. A 5-0 Mersilene was used to make a lamellar pass through the tarsal plate, and each arm was then passed through the levator aponeurosis at the musculoaponeurotic junction. The height of the lid was checked and adjusted until slight over correction was achieved. The skin was then closed on both sides with running 6-0 plain gut suture. Ophthalmic antibiotic ointment was applied to the incision. The patient tolerated the procedure well and left the operating room in stable condition.     Seth Diaz MD

## 2018-11-08 NOTE — IP AVS SNAPSHOT
[facility]    [address] [place] [address]    Phone:  [phone]    Fax:  [phone]                                       After Visit Summary   11/8/2018    Vania Gee    MRN: [id number]           After Visit Summary Signature Page     I have received my discharge instructions, and my questions have been answered. I have discussed any challenges I see with this plan with the nurse or doctor.    ..........................................................................................................................................  Patient/Patient Representative Signature      ..........................................................................................................................................  Patient Representative Print Name and Relationship to Patient    ..................................................               ................................................  Date                                   Time    ..........................................................................................................................................  Reviewed by Signature/Title    ...................................................              ..............................................  Date                                               Time          22EPIC Rev 08/18

## 2018-11-08 NOTE — BRIEF OP NOTE
[facility]    Brief Operative Note    Pre-operative diagnosis: Thyroid Eye Disease  Post-operative diagnosis * No post-op diagnosis entered *  Procedure: Procedure(s):  REPAIR PTOSIS  REPAIR RETRACTION LID  Surgeon: Surgeon(s) and Role:     * Seth Diaz MD - Primary     * Susan Jones MD - Assisting  Anesthesia: Combined MAC with Local   Estimated blood loss: Minimal  Drains: None  Specimens: * No specimens in log *  Findings:   None.  Complications: None.  Implants: None.

## 2018-11-08 NOTE — DISCHARGE INSTRUCTIONS
[facility]  Home Care Following Anesthesia  For 24 hours after surgery:  1. Get plenty of rest.  A responsible adult must stay with you for at least 24 hours after you leave the surgery center.  2. Do not drive or use heavy equipment.  If you have weakness or tingling, don't drive or use heavy equipment until this feeling goes away.   3. Do not drink alcohol.   4. Avoid strenuous or risky activities.  Ask for help when climbing stairs.  5. You may feel lightheaded.  IF so, sit for a few minutes before standing.  Have someone help you get up.   6. If you have nausea (feel sick to your stomach): Drink only clear liquids such as apple juice, ginger ale, broth or 7-Up.  Rest may also help.  Be sure to drink enough fluids.  Move to a regular diet as you feel able.   7. You may have a slight fever.  Call the doctor if your fever is over 100 F (37.7 C) (taken under the tongue) or lasts longer than 24 hours.  8. You may have a dry mouth, a sore throat, muscle aches or trouble sleeping. These should go away after 24 hours.  9. Do not make important or legal decisions.               Tips for taking pain medications  To get the best pain relief possible, remember these points:    Take pain medications as directed, before pain becomes severe.    Pain medication can upset your stomach: taking it with food may help.    Constipation is a common side effect of pain medication. Drink plenty of  fluids.    Eat foods high in fiber. Take a stool softener if recommended by your doctor or pharmacist.    Do not drink alcohol, drive or operate machinery while taking pain medications.    Ask about other ways to control pain, such as with heat, ice or relaxation.    Tylenol/Acetaminophen Consumption  To help encourage the safe use of acetaminophen, the makers of TYLENOL  have lowered the maximum daily dose for single-ingredient Extra Strength TYLENOL  (acetaminophen) products sold in the [place] from 8  pills per day (4,000 mg) to 6 pills per day (3,000 mg). The dosing interval has also changed from 2 pills every 4-6 hours to 2 pills every 6 hours.    If you feel your pain relief is insufficient, you may take Tylenol/Acetaminophen in addition to your narcotic pain medication.     Be careful not to exceed 3,000 mg of Tylenol/Acetaminophen in a 24 hour period from all sources.    If you are taking extra strength Tylenol/acetaminophen (500 mg), the maximum dose is 6 tablets in 24 hours.    If you are taking regular strength acetaminophen (325 mg), the maximum dose is 9 tablets in 24 hours.    Call a doctor for any of the followin. Signs of infection (fever, growing tenderness at the surgery site, a large amount of drainage or bleeding, severe pain, foul-smelling drainage, redness, swelling).  2. It has been over 8 to 10 hours since surgery and you are still not able to urinate (pass water).  3. Headache for over 24 hours.  4. Numbness, tingling or weakness the day after surgery (if you had spinal anesthesia).  Your doctor is:       Dr. Seth Diaz, Ophthalmology: [phone]               Or dial [phone] and ask for the resident on call for:  Ophthalmology  For emergency care, call the:  [facility]:  [phone] (TTY for hearing impaired: [phone])            Post-operative Instructions  Ophthalmic Plastic and Reconstructive Surgery    Seth Diaz M.D.     All instructions apply to the operated eye(s) or eyelid(s).    Wound care and personal care  ? If a patch or bandage has been placed, please leave this in place until seen by your physician. Ensure that the bandage does not get wet when you take a shower.  ? Apply ice compresses 15 minutes on 15 minutes off while awake for 2 days, then switch to warm water compresses 4 times a day until seen by your physician. For warm packs you can place a cup of dry uncooked rice in a clean cotton sock. Then place sock in microwave 30  seconds to one minute. Next place the warm sock into a plastic bag and wrap the bag with clean warm wet washcloth and place over operated eye.    ? You may shower or wash your hair the day after surgery. Do not bathe or go swimming for 1 week to prevent contamination of your wounds.  ? Do not apply make-up to the eyes or eyelids for 2 weeks after surgery.  ? Expect some swelling, bruising, black eye (even into the lower eyelids and cheeks). Also expect serum caking, crusting and discharge from the eye and/or incisions. A small amount of surface bleeding is normal for the first 48 hours.  ? Your eye(s) and eyelid(s) may be painful and tender. This is normal after surgery.      Contact information and follow-up  ? Return to the Eye Clinic for a follow-up appointment with your physician as  scheduled. If no appointment has been scheduled:   - [facility] eye clinic: [phone] for an appointment with Dr. Diaz within 1 to 2 weeks from your date of surgery.   -  [facility] eye clinic: [phone] for an appointment with Dr. Diaz within 1 to 2 weeks from your date of surgery.     ? For severe pain, bleeding, or loss of vision, call the [facility] at [phone] or [facility] at [phone].     After hours or on weekends and holidays, call [phone] and ask to speak with the ophthalmologist on call.    An on call person can be reached after hours for concerns. The on call doctor should not call in medication refill requests after hours or on weekends, so please plan accordingly. An effort has been made to provide adequate pain medications following every surgery, and refills will not be provided in most instances. Narcotic pain medications cannot be called in.     Activity restrictions and driving  ? Avoid heavy lifting, bending, exercise or strenuous activity for 1 week after surgery.  You may resume other activities and return to work  as tolerated.  ? You may not resume driving until have you stopped using narcotic pain medications (such as Norco, Percocet, Tylenol #3).    Medications  ? Restart all your regular home medications and eye drops. If you take Plavix or  Aspirin on a regular basis, wait for 72 hours after your surgery before restarting these in order to decrease the risk of bleeding complications.  ? Avoid aspirin and aspirin-like medications (Motrin, Aleve, Ibuprofen, Keyla-  Atkinson etc) for 72 hours to reduce the risk of bleeding. You may take Tylenol  (acetaminophen) for pain.  ? In addition to your home medications, take the following post-operative medications as prescribed by your physician.    ? Apply antibiotic ointment to all sutures three times a day, and into both eyes at night.  ? Instill eye drops 3 times a day in both eyes for 10 days.     ? WARNING:  You must not take more than 4,000 mg of acetaminophen per  24-hour period.

## 2018-11-20 ENCOUNTER — OFFICE VISIT (OUTPATIENT)
Dept: OPHTHALMOLOGY | Facility: CLINIC | Age: 48
End: 2018-11-20
Payer: COMMERCIAL

## 2018-11-20 DIAGNOSIS — Z98.890 POST-OPERATIVE STATE: Primary | ICD-10-CM

## 2018-11-20 DIAGNOSIS — H02.536 EYELID RETRACTION OF LEFT EYE: ICD-10-CM

## 2018-11-20 ASSESSMENT — VISUAL ACUITY
METHOD: SNELLEN - LINEAR
OD_SC: 20/20
OS_SC+: -1
OS_SC: 20/20

## 2018-11-20 ASSESSMENT — TONOMETRY
IOP_METHOD: ICARE
OD_IOP_MMHG: 17
OS_IOP_MMHG: 17

## 2018-11-20 NOTE — MR AVS SNAPSHOT
After Visit Summary   11/20/2018    Vania Gee    MRN: [id number]           Patient Information     Date Of Birth          1970        Visit Information        Provider Department      11/20/2018 1:45 PM Seth Diaz MD  Health Ophthalmology        Today's Diagnoses     Post-operative state    -  1    Eyelid retraction of left eye           Follow-ups after your visit        Follow-up notes from your care team     Return in about 2 months (around 1/20/2019).      Your next 10 appointments already scheduled     Feb 05, 2019 10:45 AM CST   (Arrive by 10:30 AM)   Post-Op with Seth Diaz MD   [facility] ([facility])    [address]  [address] [facility] [address] [phone]              Who to contact     Please call your clinic at [phone] to:    Ask questions about your health    Make or cancel appointments    Discuss your medicines    Learn about your test results    Speak to your doctor            Additional Information About Your Visit        [facility] Information     [facility] gives you secure access to your electronic health record. If you see a primary care provider, you can also send messages to your care team and make appointments. If you have questions, please call your primary care clinic.  If you do not have a primary care provider, please call [phone] and they will assist you.      [employer] is an electronic gateway that provides easy, online access to your medical records. With [employer], you can request a clinic appointment, read your test results, renew a prescription or communicate with your care team.     To access your existing account, please contact your [facility] or call [phone] for assistance.        Care EveryWhere ID     This is your Care EveryWhere ID. This could be used by other organizations to access your [facility] medical records  [id number]        Your Vitals  Were     Last Period                   10/25/2018 (Approximate)            Blood Pressure from Last 3 Encounters:   11/08/18 150/88   11/05/18 136/78   10/11/18 136/84    Weight from Last 3 Encounters:   11/08/18 72.1 kg (158 lb 14.4 oz)   11/05/18 71.9 kg (158 lb 9.6 oz)   10/11/18 71.5 kg (157 lb 9.6 oz)              Today, you had the following     No orders found for display       Primary Care Provider Office Phone # Fax #    MoiraSamantha Olmos [phone] [address] [address]        Equal Access to Services     [facility] KEIRY : Hadii rodrigo Kwok, wamirna carrasquillo, akshat contreras, kayode sierra. So [facility] [phone].    ATENCIÓN: Si habla español, tiene a gomez disposición servicios gratuitos de asistencia lingüística. Llame al [phone].    We comply with applicable federal civil rights laws and [place] laws. We do not discriminate on the basis of race, color, national origin, age, disability, sex, sexual orientation, or gender identity.            Thank you!     Thank you for choosing [facility] OPHTH[facility]  for your care. Our goal is always to provide you with excellent care. Hearing back from our patients is one way we can continue to improve our services. Please take a few minutes to complete the written survey that you may receive in the mail after your visit with us. Thank you!             Your Updated Medication List - Protect others around you: Learn how to safely use, store and throw away your medicines at www.disposemymeds.org.          This list is accurate as of 11/20/18  2:36 PM.  Always use your most recent med list.                   Brand Name Dispense Instructions for use Diagnosis    erythromycin ophthalmic ointment    ROMYCIN    3.5 g    Apply small amount to incision sites three times daily, then apply to inner lower lid of operative eye(s) at bedtime, as directed.    Postoperative eye state        neomycin-polymyxin-dexamethasone 3.5-58239-9.1 Susp ophthalmic susp    MAXITROL    1 Bottle    Instill 1 drop into operative eye(s) 3 times daily    Postoperative eye state

## 2018-11-20 NOTE — NURSING NOTE
Chief Complaints and History of Present Illnesses   Patient presents with     Post Op (Ophthalmology) Left Eye     s/p Left upper eyelid recession by full thickness blepharotomy. Right upper eyelid ptosis repair with levator advancement     HPI    Affected eye(s):  Left   Symptoms:     No blurred vision      Frequency:  Constant       Do you have eye pain now?:  No      Comments:  12 day postop s/p Left upper eyelid recession by full thickness blepharotomy. Right upper eyelid ptosis repair with levator advancement on 11/8/2018. Pt states healing well. No pain.     Anuel Callaway COT 1:37 PM November 20, 2018

## 2018-11-20 NOTE — PROGRESS NOTES
Chief Complaints and History of Present Illnesses   Patient presents with     Post Op (Ophthalmology) Left Eye     s/p Left upper eyelid recession by full thickness blepharotomy. Right upper eyelid ptosis repair with levator advancement     HPI: 1 week s/p Left upper eyelid recession by full thickness blepharotomy. Right upper eyelid ptosis repair with levator advancement. Doing well. States notes some dryness in the right eye but otherwise denies any changes to vision, eye pain or irritation. Continues to use ointment. Stopped compresses a day ago. Patient wondering about adjustment in the left eye as it still appears different than the right eye.    Vania Gee is a 48 year old female with the following diagnoses:   1. Post-operative state       POW#1 s/p Left upper eyelid recession by full thickness blepharotomy. Right upper eyelid ptosis repair with levator advancement on 11/8/18  - Doing well  - Continue ointment to incisions  - Continue hot compresses  - RTC in 2 months    Marco A Moya MD  Ophthalmology Resident, PGY-2  Agree with above  Some residual left upper lid retraction, right looks good. Massage left down. Use vaseline at bedtime   return to clinic 2 months if needed can consider left upper lid revision.     Attending Physician Attestation:  Complete documentation of historical and exam elements from today's encounter can be found in the full encounter summary report (not reduplicated in this progress note).  I personally obtained the chief complaint(s) and history of present illness.  I confirmed and edited as necessary the review of systems, past medical/surgical history, family history, social history, and examination findings as documented by others; and I examined the patient myself.  I personally reviewed the relevant tests, images, and reports as documented above.  I formulated and edited as necessary the assessment and plan and discussed the findings and management plan with the patient  and family. - Seth Diaz MD

## 2019-01-18 ENCOUNTER — TELEPHONE (OUTPATIENT)
Dept: FAMILY MEDICINE | Facility: CLINIC | Age: 49
End: 2019-01-18

## 2019-01-18 DIAGNOSIS — E05.00 GRAVES DISEASE: Primary | ICD-10-CM

## 2019-01-18 NOTE — TELEPHONE ENCOUNTER
These were also checked in November and were normal, so I'd recommend at least waiting until February to have them done.  Orders signed.    Shay Olmos MD

## 2019-01-18 NOTE — TELEPHONE ENCOUNTER
Dr. Olmos,    Patient told to recheck TSH, T4, and T3 in 6 months per lab result 8-20-18. Patient is looking for lab orders and is one month early, have the labs pended, ok to have labs done early?

## 2019-02-04 DIAGNOSIS — E05.00 GRAVES DISEASE: ICD-10-CM

## 2019-02-04 LAB
T3FREE SERPL-MCNC: 2.6 PG/ML (ref 2.3–4.2)
T4 FREE SERPL-MCNC: 0.94 NG/DL (ref 0.76–1.46)
TSH SERPL DL<=0.005 MIU/L-ACNC: 2.83 MU/L (ref 0.4–4)

## 2019-02-04 PROCEDURE — 36415 COLL VENOUS BLD VENIPUNCTURE: CPT | Performed by: INTERNAL MEDICINE

## 2019-02-04 PROCEDURE — 84443 ASSAY THYROID STIM HORMONE: CPT | Performed by: INTERNAL MEDICINE

## 2019-02-04 PROCEDURE — 84439 ASSAY OF FREE THYROXINE: CPT | Performed by: INTERNAL MEDICINE

## 2019-02-04 PROCEDURE — 84481 FREE ASSAY (FT-3): CPT | Performed by: INTERNAL MEDICINE

## 2019-02-06 ENCOUNTER — OFFICE VISIT (OUTPATIENT)
Dept: FAMILY MEDICINE | Facility: CLINIC | Age: 49
End: 2019-02-06
Payer: COMMERCIAL

## 2019-02-06 ENCOUNTER — HOSPITAL ENCOUNTER (OUTPATIENT)
Dept: MAMMOGRAPHY | Facility: CLINIC | Age: 49
Discharge: HOME | End: 2019-02-06
Attending: INTERNAL MEDICINE | Admitting: INTERNAL MEDICINE
Payer: COMMERCIAL

## 2019-02-06 VITALS
OXYGEN SATURATION: 99 % | HEART RATE: 85 BPM | RESPIRATION RATE: 18 BRPM | TEMPERATURE: 97.8 F | HEIGHT: 66 IN | BODY MASS INDEX: 25.11 KG/M2 | DIASTOLIC BLOOD PRESSURE: 78 MMHG | SYSTOLIC BLOOD PRESSURE: 126 MMHG | WEIGHT: 156.25 LBS

## 2019-02-06 DIAGNOSIS — N92.6 MISSED PERIOD: ICD-10-CM

## 2019-02-06 DIAGNOSIS — E05.00 GRAVES DISEASE: ICD-10-CM

## 2019-02-06 DIAGNOSIS — Z12.4 SCREENING FOR MALIGNANT NEOPLASM OF CERVIX: ICD-10-CM

## 2019-02-06 DIAGNOSIS — Z00.00 ROUTINE HEALTH MAINTENANCE: Primary | ICD-10-CM

## 2019-02-06 DIAGNOSIS — Z12.31 VISIT FOR SCREENING MAMMOGRAM: ICD-10-CM

## 2019-02-06 DIAGNOSIS — Z13.220 SCREENING CHOLESTEROL LEVEL: ICD-10-CM

## 2019-02-06 LAB
CHOLEST SERPL-MCNC: 212 MG/DL
HCG SERPL QL: NEGATIVE
HDLC SERPL-MCNC: 82 MG/DL
LDLC SERPL CALC-MCNC: 109 MG/DL
NONHDLC SERPL-MCNC: 130 MG/DL
TRIGL SERPL-MCNC: 105 MG/DL

## 2019-02-06 PROCEDURE — 36415 COLL VENOUS BLD VENIPUNCTURE: CPT | Performed by: INTERNAL MEDICINE

## 2019-02-06 PROCEDURE — 99396 PREV VISIT EST AGE 40-64: CPT | Performed by: INTERNAL MEDICINE

## 2019-02-06 PROCEDURE — 80061 LIPID PANEL: CPT | Performed by: INTERNAL MEDICINE

## 2019-02-06 PROCEDURE — 87624 HPV HI-RISK TYP POOLED RSLT: CPT | Performed by: INTERNAL MEDICINE

## 2019-02-06 PROCEDURE — 84703 CHORIONIC GONADOTROPIN ASSAY: CPT | Performed by: INTERNAL MEDICINE

## 2019-02-06 PROCEDURE — 77067 SCR MAMMO BI INCL CAD: CPT

## 2019-02-06 PROCEDURE — G0145 SCR C/V CYTO,THINLAYER,RESCR: HCPCS | Performed by: INTERNAL MEDICINE

## 2019-02-06 ASSESSMENT — MIFFLIN-ST. JEOR: SCORE: 1359.63

## 2019-02-06 NOTE — PROGRESS NOTES
SUBJECTIVE:   CC: Vania Gee is an 49 year old woman who presents for preventive health visit.     Healthy Habits:    Do you get at least three servings of calcium containing foods daily (dairy, green leafy vegetables, etc.)? yes    Amount of exercise or daily activities, outside of work: 7 day(s) per week for 60 minutes    Problems taking medications regularly not applicable    Medication side effects: No    Have you had an eye exam in the past two years? Not sure    Do you see a dentist twice per year? yes    Do you have sleep apnea, excessive snoring or daytime drowsiness?no    She's had a couple of hot flashes, missed a period in January.              Today's PHQ-2 Score:   PHQ-2 ( 1999 [employer]) 2/6/2019 1/22/2018   Q1: Little interest or pleasure in doing things 0 0   Q2: Feeling down, depressed or hopeless 0 0   PHQ-2 Score 0 0       Abuse: Current or Past(Physical, Sexual or Emotional)- No  Do you feel safe in your environment? Yes    Social History     Tobacco Use     Smoking status: Never Smoker     Smokeless tobacco: Never Used   Substance Use Topics     Alcohol use: Yes     Comment: 1 PER WEEK     If you drink alcohol do you typically have >3 drinks per day or >7 drinks per week? No                     Reviewed orders with patient.  Reviewed health maintenance and updated orders accordingly - Yes  Patient Active Problem List   Diagnosis     Graves disease     Proptosis due to thyroid disorder     Cervical high risk HPV (human papillomavirus) test positive     Past Surgical History:   Procedure Laterality Date     APPENDECTOMY       COLPOSCOPY CERVIX, BIOPSY CERVIX, ENDOCERVICAL CURETTAGE, COMBINED  02/22/2018    Negative     ECTOPIC PREGNANCY SURGERY       REPAIR PTOSIS Right 11/8/2018    Procedure: Right ptosis repair;  Surgeon: Seth Diaz MD;  Location:  OR     REPAIR RETRACTION LID Left 11/8/2018    Procedure: Left retraction repair;  Surgeon: Seth Diaz MD;  Location:  OR        Social History     Tobacco Use     Smoking status: Never Smoker     Smokeless tobacco: Never Used   Substance Use Topics     Alcohol use: Yes     Comment: 1 PER WEEK     Family History   Problem Relation Age of Onset     Heart Disease Mother      Thyroid Disease Mother      Heart Disease Father      Hypertension Father      Thyroid Disease Maternal Grandmother      Heart Disease Brother         PACE MAKER     Thyroid Disease Daughter 10     Amblyopia No family hx of      Strabismus No family hx of          Current Outpatient Medications   Medication Sig Dispense Refill     erythromycin (ROMYCIN) ophthalmic ointment Apply small amount to incision sites three times daily, then apply to inner lower lid of operative eye(s) at bedtime, as directed. (Patient not taking: Reported on 2/6/2019) 3.5 g 0     neomycin-polymyxin-dexamethasone (MAXITROL) 3.5-34206-1.1 SUSP ophthalmic susp Instill 1 drop into operative eye(s) 3 times daily (Patient not taking: Reported on 2/6/2019) 1 Bottle 0     No Known Allergies    Mammogram Screening: Patient under age 50, mutual decision reflected in health maintenance.      Pertinent mammograms are reviewed under the imaging tab.  History of abnormal Pap smear: YES - updated in Problem List and Health Maintenance accordingly  PAP / HPV Latest Ref Rng & Units 1/22/2018 3/20/2014   PAP - NIL NIL   HPV 16 DNA NEG:Negative Negative -   HPV 18 DNA NEG:Negative Positive(A) -   OTHER HR HPV NEG:Negative Negative -     Reviewed and updated as needed this visit by clinical staff  Tobacco  Allergies  Meds  Med Hx  Surg Hx  Fam Hx  Soc Hx        Reviewed and updated as needed this visit by Provider            ROS:    10 point ROS of systems including Constitutional, Eyes, Respiratory, Cardiovascular, Gastroenterology, Genitourinary, Integumentary, Muscularskeletal, Psychiatric were all negative except for pertinent positives noted in my HPI.     OBJECTIVE:   /78   Pulse 85   Temp 97.8  " F (36.6  C) (Tympanic)   Resp 18   Ht 1.675 m (5' 5.95\")   Wt 70.9 kg (156 lb 4 oz)   LMP 12/07/2018 (Approximate)   SpO2 99%   Breastfeeding? No   BMI 25.26 kg/m    EXAM:  GENERAL: healthy, alert and no distress  EYES: Eyes grossly normal to inspection, PERRL and conjunctivae and sclerae normal  HENT: ear canals and TM's normal, nose and mouth without ulcers or lesions  NECK: no adenopathy, no asymmetry, masses, or scars and thyroid normal to palpation  RESP: lungs clear to auscultation - no rales, rhonchi or wheezes  BREAST: normal without masses, tenderness or nipple discharge and no palpable axillary masses or adenopathy  CV: regular rate and rhythm, normal S1 S2, no S3 or S4, no murmur, click or rub, no peripheral edema and peripheral pulses strong  ABDOMEN: soft, nontender, no hepatosplenomegaly, no masses and bowel sounds normal   (female): normal female external genitalia, normal urethral meatus, vaginal mucosa pink, moist, well rugated, and normal cervix/adnexa/uterus without masses or discharge  MS: no gross musculoskeletal defects noted, no edema  SKIN: no suspicious lesions or rashes  NEURO: Normal strength and tone, mentation intact and speech normal  PSYCH: mentation appears normal, affect normal/bright    Diagnostic Test Results:  Results for orders placed or performed in visit on 02/06/19 (from the past 24 hour(s))   Lipid panel reflex to direct LDL Fasting   Result Value Ref Range    Cholesterol 212 (H) <200 mg/dL    Triglycerides 105 <150 mg/dL    HDL Cholesterol 82 >49 mg/dL    LDL Cholesterol Calculated 109 (H) <100 mg/dL    Non HDL Cholesterol 130 (H) <130 mg/dL   HCG Qual, Blood ([id number])   Result Value Ref Range    HCG Qualitative Serum Negative NEG^Negative       ASSESSMENT/PLAN:   1. Routine health maintenance      Pap smear: done today    Immunizations: flu vax declined    Lab Studies: glucose, normal last year.  Cholesterol due.  HIV screen declined    Mammogram: done this " "morning      2. Screening for malignant neoplasm of cervix    - Pap imaged thin layer screen with HPV - recommended age 30 - 65 years (select HPV order below)  - HPV High Risk Types DNA Cervical    3. Screening cholesterol level    - Lipid panel reflex to direct LDL Fasting    4. Missed period    HCG normal, maybe be entering perimenopausal periods, will continue to observe.     - HCG Qual, Blood ([id number])    5. Graves disease    Recent labs normal, not on treatment.  Will plan next check in 6 months for monitoring.     - TSH; Future  - T4 FREE; Future  - T3, Free; Future    COUNSELING:   Reviewed preventive health counseling, as reflected in patient instructions    BP Readings from Last 1 Encounters:   02/06/19 126/78     Estimated body mass index is 25.26 kg/m  as calculated from the following:    Height as of this encounter: 1.675 m (5' 5.95\").    Weight as of this encounter: 70.9 kg (156 lb 4 oz).           reports that  has never smoked. she has never used smokeless tobacco.      Counseling Resources:  ATP IV Guidelines  Pooled Cohorts Equation Calculator  Breast Cancer Risk Calculator  FRAX Risk Assessment  ICSI Preventive Guidelines  Dietary Guidelines for Americans, 2010  USDA's MyPlate  ASA Prophylaxis  Lung CA Screening    Shay Olmos MD  [facility]  "

## 2019-02-06 NOTE — LETTER
[facility]  [address] [place] [address]  Phone: [phone]    February 6, 2019        Vania Gee  [address] [place] [address]          Dear Vania,      Cholesterol levels are a bit high, but not high enough to require medication. Enclosed is a packet on healthy diet for cholesterol. Continue to work on healthy diet and have your cholesterol rechecked in 2-3 years.     Component      Latest Ref Rng & Units 2/6/2019   Cholesterol      <200 mg/dL 212 (H)   Triglycerides      <150 mg/dL 105   HDL Cholesterol      >49 mg/dL 82   LDL Cholesterol Calculated      <100 mg/dL 109 (H)   Non HDL Cholesterol      <130 mg/dL 130 (H)   HCG Qualitative Serum      NEG:Negative Negative       Sincerely,        Delmis Olmos MD / tye cma

## 2019-02-11 LAB
COPATH REPORT: NORMAL
PAP: NORMAL

## 2019-02-12 ENCOUNTER — HOSPITAL ENCOUNTER (OUTPATIENT)
Dept: ULTRASOUND IMAGING | Facility: CLINIC | Age: 49
End: 2019-02-12
Attending: INTERNAL MEDICINE
Payer: COMMERCIAL

## 2019-02-12 ENCOUNTER — HOSPITAL ENCOUNTER (OUTPATIENT)
Dept: MAMMOGRAPHY | Facility: CLINIC | Age: 49
Discharge: HOME | End: 2019-02-12
Attending: INTERNAL MEDICINE | Admitting: INTERNAL MEDICINE
Payer: COMMERCIAL

## 2019-02-12 DIAGNOSIS — R92.8 ABNORMAL MAMMOGRAM: ICD-10-CM

## 2019-02-12 LAB
FINAL DIAGNOSIS: ABNORMAL
HPV HR 12 DNA CVX QL NAA+PROBE: NEGATIVE
HPV16 DNA SPEC QL NAA+PROBE: POSITIVE
HPV18 DNA SPEC QL NAA+PROBE: NEGATIVE
SPECIMEN DESCRIPTION: ABNORMAL
SPECIMEN SOURCE CVX/VAG CYTO: ABNORMAL

## 2019-02-12 PROCEDURE — G0279 TOMOSYNTHESIS, MAMMO: HCPCS

## 2019-02-12 PROCEDURE — 76642 ULTRASOUND BREAST LIMITED: CPT | Mod: RT

## 2019-04-10 ENCOUNTER — TELEPHONE (OUTPATIENT)
Dept: FAMILY MEDICINE | Facility: CLINIC | Age: 49
End: 2019-04-10

## 2019-04-10 NOTE — TELEPHONE ENCOUNTER
S:  Patient called with status update     B:  Patient has had flu symptoms for the last three days    A:  Temp first measured 103.1 today, is responsive to medication.  Pressure headache, not the worst but annoying.  Sweating and freezing chills.    Takes big breaths not congested.  Coughing dry.  Denies s/sx of respiratory distress.  Is able to talk to writer over thye phone in full sentences without becoming SOB.    R:  ED precautions r/t  Influenza discussed.  Patient instructed to self medicate with alternating tylenol and ibuprofen to control fever.  Patient will continue to self monitor and will call back if there are any changes.    No further action necessary.  'Encounter closed.    George Banda RN

## 2019-04-10 NOTE — TELEPHONE ENCOUNTER
Reason for Call:  appointment    Detailed comments: Pt is calling and has a 103.1 fever with symptoms of cough and HA since Sunday.  She doesn't want to come in unless she does get some kind of medicine also will see anyone available.  Please advise.    Phone Number Patient can be reached at: Home number on file [phone] (home)    Best Time: any    Can we leave a detailed message on this number? YES    Call taken on 4/10/2019 at 8:20 AM by Chelita Noble

## 2019-04-12 ENCOUNTER — OFFICE VISIT (OUTPATIENT)
Dept: FAMILY MEDICINE | Facility: CLINIC | Age: 49
End: 2019-04-12
Payer: COMMERCIAL

## 2019-04-12 VITALS
DIASTOLIC BLOOD PRESSURE: 76 MMHG | BODY MASS INDEX: 25.79 KG/M2 | HEART RATE: 122 BPM | OXYGEN SATURATION: 95 % | WEIGHT: 160.5 LBS | TEMPERATURE: 103.1 F | HEIGHT: 66 IN | SYSTOLIC BLOOD PRESSURE: 112 MMHG

## 2019-04-12 DIAGNOSIS — J18.9 COMMUNITY ACQUIRED PNEUMONIA, UNSPECIFIED LATERALITY: ICD-10-CM

## 2019-04-12 DIAGNOSIS — R68.89 FLU-LIKE SYMPTOMS: Primary | ICD-10-CM

## 2019-04-12 LAB
FLUAV+FLUBV AG SPEC QL: NEGATIVE
FLUAV+FLUBV AG SPEC QL: NEGATIVE
SPECIMEN SOURCE: NORMAL

## 2019-04-12 PROCEDURE — 99213 OFFICE O/P EST LOW 20 MIN: CPT | Performed by: PHYSICIAN ASSISTANT

## 2019-04-12 PROCEDURE — 87804 INFLUENZA ASSAY W/OPTIC: CPT | Performed by: PHYSICIAN ASSISTANT

## 2019-04-12 RX ORDER — ALBUTEROL SULFATE 90 UG/1
2 AEROSOL, METERED RESPIRATORY (INHALATION) EVERY 6 HOURS PRN
Qty: 18 G | Refills: 1 | Status: SHIPPED | OUTPATIENT
Start: 2019-04-12 | End: 2020-02-19

## 2019-04-12 RX ORDER — AZITHROMYCIN 250 MG/1
TABLET, FILM COATED ORAL
Qty: 6 TABLET | Refills: 0 | Status: SHIPPED | OUTPATIENT
Start: 2019-04-12 | End: 2019-04-17

## 2019-04-12 ASSESSMENT — MIFFLIN-ST. JEOR: SCORE: 1378.9

## 2019-04-12 NOTE — PATIENT INSTRUCTIONS
Have plenty of rest and fluids  Humidified air can be very helpful with cough  Take zpak antibiotics   Use inhaler every 4-6 hours during this illness  Follow up if worsening symptoms or if not improving  Be seen urgently if worsening breathing worsening headache, stiff neck

## 2019-04-12 NOTE — PROGRESS NOTES
SUBJECTIVE:   Vania Gee is a 49 year old female who presents to clinic today for the following   health issues:    ENT Symptoms             Symptoms: cc Present Absent Comment   Fever/Chills x x  High fevers of 103.5 on Sunday, chills/fever since   Fatigue  x     Muscle Aches  x     Eye Irritation  x  Watering eyes   Sneezing   x    Nasal Vinay/Drg   x     Sinus Pressure/Pain   x    Loss of smell   x    Dental pain   x    Sore Throat   x laryngitis   Swollen Glands   x    Ear Pain/Fullness  x     Cough x x  Dry hacky cough, can hear phlegm in throat   Wheeze  x  A little bit after coughing   Chest Pain   x Chest tightness   Shortness of breath  x  When talking a lot   Rash   x    Other x x  Really bad headaches - tylenol helps     Symptom duration:  Sunday, but yesterday started to get worse    Symptom severity:  moderate   Treatments tried:  Nyquil last night. Dayquil, tylenol, advil, sudafed sinus. Nothing PO today   Contacts:  Just got back from [place], was around sick kid while on vacation     She is eating, drinking fluids  Normal urination  A little looser stools after dayquil, no cramps        Additional history: as documented    Reviewed  and updated as needed this visit by clinical staff         Reviewed and updated as needed this visit by Provider  Allergies  Meds  Problems  Med Hx  Surg Hx         Patient Active Problem List   Diagnosis     Graves disease     Proptosis due to thyroid disorder     Cervical high risk HPV (human papillomavirus) test positive     Past Surgical History:   Procedure Laterality Date     APPENDECTOMY       COLPOSCOPY CERVIX, BIOPSY CERVIX, ENDOCERVICAL CURETTAGE, COMBINED  02/22/2018    Negative     ECTOPIC PREGNANCY SURGERY       REPAIR PTOSIS Right 11/8/2018    Procedure: Right ptosis repair;  Surgeon: Seth Diaz MD;  Location:  OR     REPAIR RETRACTION LID Left 11/8/2018    Procedure: Left retraction repair;  Surgeon: Seth Diaz MD;  Location:   "OR       Social History     Tobacco Use     Smoking status: Never Smoker     Smokeless tobacco: Never Used   Substance Use Topics     Alcohol use: Yes     Comment: 1 PER WEEK     Family History   Problem Relation Age of Onset     Heart Disease Mother      Thyroid Disease Mother      Heart Disease Father      Hypertension Father      Thyroid Disease Maternal Grandmother      Heart Disease Brother         PACE MAKER     Thyroid Disease Daughter 10     Amblyopia No family hx of      Strabismus No family hx of            ROS:  Other than noted above, general, HEENT, respiratory, cardiac, MS, and gastrointestinal systems are negative.     OBJECTIVE:     /76   Pulse 122   Temp 103.1  F (39.5  C) (Tympanic)   Ht 1.675 m (5' 5.95\")   Wt 72.8 kg (160 lb 8 oz)   SpO2 95%   BMI 25.95 kg/m    Body mass index is 25.95 kg/m .  GENERAL: healthy, alert and no distress  HENT: ear canals and TM's normal, nose and mouth without ulcers or lesions  NECK: no adenopathy, no asymmetry, masses, or scars and thyroid normal to palpation  RESP: POSITIVE a few scattered wheezes. Left lower lobe crackling  CV: regular rate and rhythm, normal S1 S2, no S3 or S4, no murmur, click or rub, no peripheral edema and peripheral pulses strong  ABDOMEN: soft, nontender, no hepatosplenomegaly, no masses and bowel sounds normal  MS: no gross musculoskeletal defects noted, no edema    Diagnostic Test Results:  Influenza - NEGATIVE - however did not get a good sample per CMA as patient kept pulling away and did not tolerate the swab. Only able to swab one nostril and not far in.    Patient well appearing, breathing easily in clinic, speaking in full sentences easily, no signs of cyanosis or respiratory distress.  650 mg tylenol given at 11:30AM    ASSESSMENT/PLAN:     ASSESSMENT/PLAN:      ICD-10-CM    1. Flu-like symptoms R68.89 Influenza A/B antigen   2. Community acquired pneumonia, unspecified laterality J18.9 azithromycin (ZITHROMAX) 250 MG " tablet     albuterol (PROAIR HFA/PROVENTIL HFA/VENTOLIN HFA) 108 (90 Base) MCG/ACT inhaler   suspect flu with patient developing pneumonia. Offered CXR, patient deferred.    Patient Instructions   Have plenty of rest and fluids  Humidified air can be very helpful with cough  Take zpak antibiotics   Use inhaler every 4-6 hours during this illness  Follow up if worsening symptoms or if not improving  Be seen urgently if worsening breathing worsening headache, stiff neck    Amanda Bauman PA-C  [facility]

## 2019-06-12 ENCOUNTER — OFFICE VISIT (OUTPATIENT)
Dept: OBGYN | Facility: CLINIC | Age: 49
End: 2019-06-12
Payer: COMMERCIAL

## 2019-06-12 VITALS
SYSTOLIC BLOOD PRESSURE: 122 MMHG | DIASTOLIC BLOOD PRESSURE: 73 MMHG | TEMPERATURE: 97.5 F | RESPIRATION RATE: 18 BRPM | WEIGHT: 160 LBS | HEART RATE: 84 BPM | HEIGHT: 65 IN | BODY MASS INDEX: 26.66 KG/M2

## 2019-06-12 DIAGNOSIS — R87.810 CERVICAL HIGH RISK HPV (HUMAN PAPILLOMAVIRUS) TEST POSITIVE: Primary | ICD-10-CM

## 2019-06-12 PROCEDURE — 57456 ENDOCERV CURETTAGE W/SCOPE: CPT | Performed by: OBSTETRICS & GYNECOLOGY

## 2019-06-12 ASSESSMENT — MIFFLIN-ST. JEOR: SCORE: 1361.64

## 2019-06-12 NOTE — PROGRESS NOTES
Colposcopy/LEEP  Date/Time: 6/12/2019 10:09 AM  Performed by: Bibi Craig MD  Authorized by: Bibi Craig MD     Consent:     Consent obtained:  Verbal and written    Consent given by:  Patient    Procedural risks discussed:  Bleeding    Patient questions answered: yes      Patient agrees, verbalizes understanding, and wants to proceed: yes      Educational handouts given: no      Instructions and paperwork completed: yes    Pre-procedure:     Pre-procedure timeout performed: yes      Prepped with: acetic acid    Indication:     Indication:  HPV  Procedure:     Procedure: Colposcopy w/ endocervical curettage      Under satisfactory analgesia the patient was prepped and draped in the dorsal lithotomy position: yes      [place] speculum was placed in the vagina: yes      Under colposcopic examination the transition zone was seen in entirety: yes      Intracervical block was performed: no      Endocervix was curetted using a Kevorkian curette: yes      Tampon inserted: no      Monsel's solution was applied: no      Biopsy(s): yes      Location:  Endocervical curretings    Specimen to pathology: yes    Post-procedure:     Findings: Negative and Polyps      Polyp Location:  Small endocervical polyp at approx 2 pm in canal; disintegrated when grasped    Impression: Normal appearing cervix      Patient tolerance of procedure:  Patient tolerated the procedure well with no immediate complications    Bibi Craig MD  [facility]

## 2019-06-13 PROCEDURE — 88305 TISSUE EXAM BY PATHOLOGIST: CPT | Performed by: OBSTETRICS & GYNECOLOGY

## 2019-06-18 LAB — COPATH REPORT: NORMAL

## 2019-11-06 ENCOUNTER — HEALTH MAINTENANCE LETTER (OUTPATIENT)
Age: 49
End: 2019-11-06

## 2019-11-12 DIAGNOSIS — E05.00 GRAVES DISEASE: ICD-10-CM

## 2019-11-12 LAB
T3FREE SERPL-MCNC: 3 PG/ML (ref 2.3–4.2)
T4 FREE SERPL-MCNC: 0.96 NG/DL (ref 0.76–1.46)
TSH SERPL DL<=0.005 MIU/L-ACNC: 3.47 MU/L (ref 0.4–4)

## 2019-11-12 PROCEDURE — 84439 ASSAY OF FREE THYROXINE: CPT | Performed by: INTERNAL MEDICINE

## 2019-11-12 PROCEDURE — 84443 ASSAY THYROID STIM HORMONE: CPT | Performed by: INTERNAL MEDICINE

## 2019-11-12 PROCEDURE — 84481 FREE ASSAY (FT-3): CPT | Performed by: INTERNAL MEDICINE

## 2019-11-12 PROCEDURE — 36415 COLL VENOUS BLD VENIPUNCTURE: CPT | Performed by: INTERNAL MEDICINE

## 2020-02-18 NOTE — PROGRESS NOTES
SUBJECTIVE:   CC: Vania Gee is an 50 year old woman who presents for preventive health visit.   Chief Complaint   Patient presents with     Physical     Imm/Inj     flu vaccine deferred        Healthy Habits:    Getting at least 3 servings of Calcium per day:  Yes    Bi-annual eye exam:  NO    Dental care twice a year:  Yes    Sleep apnea or symptoms of sleep apnea:  None    Diet:  Regular (no restrictions)    Frequency of exercise:  6-7 days/week (on feet 4 hours daily running around)    Duration of exercise:  Greater than 60 minutes    Taking medications regularly:  Not Applicable    Barriers to taking medications:  Not applicable    Medication side effects:  Not applicable    PHQ-2 Total Score:    Additional concerns today:  No      She has a history of Graves disease but recently thyroid levels have been good off medication, so she would like to decrease lab check frequency and will recheck this next year.     Today's PHQ-2 Score:   PHQ-2 ( 1999 [employer]) 2/19/2020   Q1: Little interest or pleasure in doing things 0   Q2: Feeling down, depressed or hopeless 0   PHQ-2 Score 0       Abuse: Current or Past(Physical, Sexual or Emotional)- No  Do you feel safe in your environment? Yes        Social History     Tobacco Use     Smoking status: Never Smoker     Smokeless tobacco: Never Used   Substance Use Topics     Alcohol use: Yes     Comment: 1 PER WEEK     If you drink alcohol do you typically have >3 drinks per day or >7 drinks per week? No    Alcohol Use 3/20/2014   Prescreen: >3 drinks/day or >7 drinks/week? The patient does not drink >3 drinks per day nor >7 drinks per week.       Reviewed orders with patient.  Reviewed health maintenance and updated orders accordingly - Yes  Patient Active Problem List   Diagnosis     Graves disease     Proptosis due to thyroid disorder     Cervical high risk HPV (human papillomavirus) test positive     Past Surgical History:   Procedure Laterality Date     APPENDECTOMY        COLPOSCOPY CERVIX, BIOPSY CERVIX, ENDOCERVICAL CURETTAGE, COMBINED  02/22/2018    Negative     ECTOPIC PREGNANCY SURGERY       REPAIR PTOSIS Right 11/8/2018    Procedure: Right ptosis repair;  Surgeon: Seth Diaz MD;  Location: [facility] OR     REPAIR RETRACTION LID Left 11/8/2018    Procedure: Left retraction repair;  Surgeon: Seth Diaz MD;  Location:  OR       Social History     Tobacco Use     Smoking status: Never Smoker     Smokeless tobacco: Never Used   Substance Use Topics     Alcohol use: Yes     Comment: 1 PER WEEK     Family History   Problem Relation Age of Onset     Heart Disease Mother      Thyroid Disease Mother      Heart Disease Father      Hypertension Father      Thyroid Disease Maternal Grandmother      Heart Disease Brother         PACE MAKER     Thyroid Disease Daughter 10     Amblyopia No family hx of      Strabismus No family hx of          No current outpatient medications on file.     No Known Allergies    Mammogram Screening: Patient under age 50, mutual decision reflected in health maintenance.      Pertinent mammograms are reviewed under the imaging tab.  History of abnormal Pap smear: YES - updated in Problem List and Health Maintenance accordingly  PAP / HPV Latest Ref Rng & Units 2/6/2019 1/22/2018 3/20/2014   PAP - NIL NIL NIL   HPV 16 DNA NEG:Negative Positive(A) Negative -   HPV 18 DNA NEG:Negative Negative Positive(A) -   OTHER HR HPV NEG:Negative Negative Negative -     Reviewed and updated as needed this visit by clinical staff  Tobacco  Allergies  Meds  Med Hx  Surg Hx  Fam Hx  Soc Hx        Reviewed and updated as needed this visit by Provider            Review of Systems    10 point ROS of systems including Constitutional, Eyes, Respiratory, Cardiovascular, Gastroenterology, Genitourinary, Integumentary, Muscularskeletal, Psychiatric were all negative except for pertinent positives noted in my HPI plus some recent right elbow pain from injury and  "somewhat more irregular periods.        OBJECTIVE:   /76 (BP Location: Left arm, Patient Position: Sitting, Cuff Size: Adult Regular)   Pulse 85   Temp 97.9  F (36.6  C) (Tympanic)   Ht 1.657 m (5' 5.25\")   Wt 72.4 kg (159 lb 11.2 oz)   SpO2 99%   BMI 26.37 kg/m    Physical Exam  GENERAL: healthy, alert and no distress  EYES: Eyes grossly normal to inspection, PERRL and conjunctivae and sclerae normal  HENT: ear canals and TM's normal, nose and mouth without ulcers or lesions  NECK: no adenopathy, no asymmetry, masses, or scars and thyroid normal to palpation  RESP: lungs clear to auscultation - no rales, rhonchi or wheezes  CV: regular rate and rhythm, normal S1 S2, no S3 or S4, no murmur, click or rub, no peripheral edema   ABDOMEN: soft, nontender, no hepatosplenomegaly, no masses and bowel sounds normal  MS: no gross musculoskeletal defects noted, no edema  SKIN: no suspicious lesions or rashes  NEURO: Normal strength and tone, mentation intact and speech normal  PSYCH: mentation appears normal, affect normal/bright      ASSESSMENT/PLAN:   1. Routine general medical examination at a health care facility      Pap smear: due this year due to abnormal Pap last year with normal colposcopy in June 2019.  However, she states her insurance does not cover diagnostic Paps, only screening Paps every 3 years, so she is not going to schedule this and will just \"take my chances\" since the colposcopy was normal.      Immunizations: flu vax declined    Lab Studies: will plan to check glucose and lipid next in 2021    Mammogram: normal today         COUNSELING:  Reviewed preventive health counseling, as reflected in patient instructions    Estimated body mass index is 26.37 kg/m  as calculated from the following:    Height as of this encounter: 1.657 m (5' 5.25\").    Weight as of this encounter: 72.4 kg (159 lb 11.2 oz).    Weight management plan: Discussed healthy diet and exercise guidelines     reports that she " has never smoked. She has never used smokeless tobacco.        Shay Olmos MD  [facility]

## 2020-02-19 ENCOUNTER — HOSPITAL ENCOUNTER (OUTPATIENT)
Dept: MAMMOGRAPHY | Facility: CLINIC | Age: 50
Discharge: HOME | End: 2020-02-19
Attending: INTERNAL MEDICINE | Admitting: INTERNAL MEDICINE
Payer: COMMERCIAL

## 2020-02-19 ENCOUNTER — OFFICE VISIT (OUTPATIENT)
Dept: FAMILY MEDICINE | Facility: CLINIC | Age: 50
End: 2020-02-19
Payer: COMMERCIAL

## 2020-02-19 VITALS
BODY MASS INDEX: 26.61 KG/M2 | SYSTOLIC BLOOD PRESSURE: 130 MMHG | HEIGHT: 65 IN | TEMPERATURE: 97.9 F | HEART RATE: 85 BPM | WEIGHT: 159.7 LBS | DIASTOLIC BLOOD PRESSURE: 76 MMHG | OXYGEN SATURATION: 99 %

## 2020-02-19 DIAGNOSIS — Z00.00 ROUTINE GENERAL MEDICAL EXAMINATION AT A HEALTH CARE FACILITY: Primary | ICD-10-CM

## 2020-02-19 DIAGNOSIS — Z12.31 SCREENING MAMMOGRAM, ENCOUNTER FOR: ICD-10-CM

## 2020-02-19 PROCEDURE — 99396 PREV VISIT EST AGE 40-64: CPT | Performed by: INTERNAL MEDICINE

## 2020-02-19 PROCEDURE — 77067 SCR MAMMO BI INCL CAD: CPT

## 2020-02-19 ASSESSMENT — MIFFLIN-ST. JEOR: SCORE: 1359.23

## 2020-11-04 ENCOUNTER — PATIENT OUTREACH (OUTPATIENT)
Dept: FAMILY MEDICINE | Facility: CLINIC | Age: 50
End: 2020-11-04

## 2020-11-04 DIAGNOSIS — R87.810 CERVICAL HIGH RISK HPV (HUMAN PAPILLOMAVIRUS) TEST POSITIVE: ICD-10-CM

## 2020-11-29 ENCOUNTER — HEALTH MAINTENANCE LETTER (OUTPATIENT)
Age: 50
End: 2020-11-29

## 2020-12-23 ENCOUNTER — TELEPHONE (OUTPATIENT)
Dept: FAMILY MEDICINE | Facility: CLINIC | Age: 50
End: 2020-12-23

## 2020-12-23 DIAGNOSIS — E05.00 GRAVES DISEASE: Primary | ICD-10-CM

## 2020-12-30 DIAGNOSIS — E05.00 GRAVES DISEASE: ICD-10-CM

## 2020-12-30 LAB
T4 FREE SERPL-MCNC: 0.87 NG/DL (ref 0.76–1.46)
TSH SERPL DL<=0.005 MIU/L-ACNC: 4.39 MU/L (ref 0.4–4)

## 2020-12-30 PROCEDURE — 36415 COLL VENOUS BLD VENIPUNCTURE: CPT | Performed by: INTERNAL MEDICINE

## 2020-12-30 PROCEDURE — 84443 ASSAY THYROID STIM HORMONE: CPT | Performed by: INTERNAL MEDICINE

## 2020-12-30 PROCEDURE — 84439 ASSAY OF FREE THYROXINE: CPT | Performed by: INTERNAL MEDICINE

## 2020-12-30 PROCEDURE — 84481 FREE ASSAY (FT-3): CPT | Performed by: INTERNAL MEDICINE

## 2020-12-31 LAB — T3FREE SERPL-MCNC: 2.3 PG/ML (ref 2.3–4.2)

## 2021-04-10 ENCOUNTER — HEALTH MAINTENANCE LETTER (OUTPATIENT)
Age: 51
End: 2021-04-10

## 2021-05-30 ENCOUNTER — HEALTH MAINTENANCE LETTER (OUTPATIENT)
Age: 51
End: 2021-05-30

## 2021-09-19 ENCOUNTER — HEALTH MAINTENANCE LETTER (OUTPATIENT)
Age: 51
End: 2021-09-19

## 2021-11-04 ENCOUNTER — TELEPHONE (OUTPATIENT)
Dept: FAMILY MEDICINE | Facility: CLINIC | Age: 51
End: 2021-11-04

## 2021-11-04 NOTE — TELEPHONE ENCOUNTER
Patient told she is in need of an office visit.  Almost 2 years since last appt.  Can't afford it now.  Patient told about [facility]. Chelita TYSON RN

## 2021-11-10 ENCOUNTER — APPOINTMENT (OUTPATIENT)
Dept: LAB | Facility: CLINIC | Age: 51
End: 2021-11-10
Payer: COMMERCIAL

## 2021-12-15 ENCOUNTER — DOCUMENTATION ONLY (OUTPATIENT)
Dept: LAB | Facility: CLINIC | Age: 51
End: 2021-12-15
Payer: COMMERCIAL

## 2021-12-15 NOTE — PROGRESS NOTES
Patient has upcoming lab appointment 12/20/2021 for TSH. Please place orders for appointment.   Thank you,  Lab

## 2021-12-20 ENCOUNTER — APPOINTMENT (OUTPATIENT)
Dept: LAB | Facility: CLINIC | Age: 51
End: 2021-12-20
Payer: COMMERCIAL

## 2022-01-03 ENCOUNTER — TELEPHONE (OUTPATIENT)
Dept: FAMILY MEDICINE | Facility: CLINIC | Age: 52
End: 2022-01-03
Payer: COMMERCIAL

## 2022-01-03 ENCOUNTER — MYC MEDICAL ADVICE (OUTPATIENT)
Dept: FAMILY MEDICINE | Facility: CLINIC | Age: 52
End: 2022-01-03
Payer: COMMERCIAL

## 2022-01-03 NOTE — TELEPHONE ENCOUNTER
Received call from patient asking if she can send labs through my chart from [employer].  Patient has been going to [facility] and wants to get the copy of her TSH,T4 to Dr Escobar.  Patient has an appointment with Dr Escobar on 2/8/22.  She would like to have her labs addressed before this appointment as she has been feeling fatigued, weight gain and irritability.  This writer tried to assist her with my chart and was unsuccessful.  Transferred patient to [facility] to assist with my chart sending message to Dr Escobar care team and attaching lab report from [facility].    Patient reports 11/10/21: TSH 10.18 and  T4 0.95  12/20/21 TSH 17.6 and T4 4.82  Jessica Rodney RN

## 2022-01-09 NOTE — TELEPHONE ENCOUNTER
Neuro:  Pt withdraws to painful stimulation. Sedated with propofol/versed. RASS -4.    Fever/Pain: Fever increased to 101.3 this shift along with elevated WBC this AM. Fentanyl gtt for pain.     CV:  SR/ST. MAP>65 maintained with levo @ 0.12    Pulm: Lung sounds clear/diminished. Small thick white blood-tinged respiratory secretions. Proned at 1230. Remains on full ventilator support @70%/20/480/16.    : Bobo for strict I&Os/deep sedation, low urine output - provider notified of downtrending UOP, 500mL LR bolus given with minimal UOP.     GI: Hypoactive bowel sounds. NPO. Glu levels low 100s, given sliding scale insulin. Last BM 1/4, stool softners given.       Outside records reviewed- looks like she was having these labs checked every few months, so orders signed.  I do see that the eye doctor referred her to establish with endocrinology here, but I don't seen an appointment scheduled.  Please remind her to call to schedule this.    Your provider has referred you to: [facility] - [place] [phone]   [email or web address]   Referred to see Dr. Patricia Martins    Thanks,  Shay Olmos MD

## 2022-01-10 ENCOUNTER — VIRTUAL VISIT (OUTPATIENT)
Dept: FAMILY MEDICINE | Facility: CLINIC | Age: 52
End: 2022-01-10
Payer: COMMERCIAL

## 2022-01-10 VITALS — WEIGHT: 175 LBS | BODY MASS INDEX: 29.16 KG/M2 | HEIGHT: 65 IN

## 2022-01-10 DIAGNOSIS — E03.8 SUBCLINICAL HYPOTHYROIDISM: Primary | ICD-10-CM

## 2022-01-10 PROCEDURE — 99213 OFFICE O/P EST LOW 20 MIN: CPT | Mod: 95 | Performed by: INTERNAL MEDICINE

## 2022-01-10 RX ORDER — LEVOTHYROXINE SODIUM 125 UG/1
125 TABLET ORAL DAILY
Qty: 90 TABLET | Refills: 0 | Status: SHIPPED | OUTPATIENT
Start: 2022-01-10 | End: 2022-04-05

## 2022-01-10 ASSESSMENT — ANXIETY QUESTIONNAIRES
3. WORRYING TOO MUCH ABOUT DIFFERENT THINGS: MORE THAN HALF THE DAYS
5. BEING SO RESTLESS THAT IT IS HARD TO SIT STILL: SEVERAL DAYS
1. FEELING NERVOUS, ANXIOUS, OR ON EDGE: SEVERAL DAYS
2. NOT BEING ABLE TO STOP OR CONTROL WORRYING: MORE THAN HALF THE DAYS
6. BECOMING EASILY ANNOYED OR IRRITABLE: MORE THAN HALF THE DAYS
IF YOU CHECKED OFF ANY PROBLEMS ON THIS QUESTIONNAIRE, HOW DIFFICULT HAVE THESE PROBLEMS MADE IT FOR YOU TO DO YOUR WORK, TAKE CARE OF THINGS AT HOME, OR GET ALONG WITH OTHER PEOPLE: SOMEWHAT DIFFICULT
GAD7 TOTAL SCORE: 10
7. FEELING AFRAID AS IF SOMETHING AWFUL MIGHT HAPPEN: NOT AT ALL

## 2022-01-10 ASSESSMENT — PATIENT HEALTH QUESTIONNAIRE - PHQ9
5. POOR APPETITE OR OVEREATING: MORE THAN HALF THE DAYS
SUM OF ALL RESPONSES TO PHQ QUESTIONS 1-9: 13

## 2022-01-10 ASSESSMENT — MIFFLIN-ST. JEOR: SCORE: 1423.63

## 2022-01-10 NOTE — PROGRESS NOTES
Vania is a 51 year old who is being evaluated via a billable telephone visit.      What phone number would you like to be contacted at? [phone]  How would you like to obtain your AVS? MyChart    Assessment & Plan     Subclinical hypothyroidism    Vania has a prior history of Graves disease but now presents with symptoms of hypothyroidism.  She already had labs checked and these are in the subclinical hypothyroid range with high TSH and normal T4.  Since TSH is over 10 and she is symptomatic, we'll start levothyroxine.  She is returning for a visit in a month, so we'll recheck TSH then, though it may be a bit early.  Advised her to let us know if she gets any hyperthyroid symptoms after starting the medication.     - **TSH with free T4 reflex FUTURE 2mo; Future  - levothyroxine (SYNTHROID/LEVOTHROID) 125 MCG tablet; Take 1 tablet (125 mcg) by mouth daily      14 minutes spent on the date of the encounter doing chart review, history and exam, documentation and further activities per the note    Shay Olmos MD  [facility]    Subjective   Vania is a 51 year old who presents for the following health issues     HPI     Hypothyroidism Follow-up      Since last visit, patient describes the following symptoms: weight gain of 20 lbs over the past year (despite no lifestyle change)- 175lbs, dry skin (seasonal), occasional constipation, occasional loose stools, anxiety, fatigue and hair loss      How many servings of fruits and vegetables do you eat daily?  0-1    On average, how many sweetened beverages do you drink each day (Examples: soda, juice, sweet tea, etc.  Do NOT count diet or artificially sweetened beverages)?   0    How many days per week do you exercise enough to make your heart beat faster? 4    How many minutes a day do you exercise enough to make your heart beat faster? 30 - 60    How many days per week do you miss taking your medication? 0    Vania has a history of previously  Grave's disease, not currently on medication, and she has been checking her TSH via [facility] and reports the following readings via [employer]:      I had TSH and T4 tests done on 11/10/2021. TSH was 10.18 and Free T4 was .95 (normal range)     I had TSH and T4 tests done on 12/20/2021. TSH was 17.60 and Free T4 was .82 (normal range)      Review of Systems   Constitutional, endo systems are negative, except as otherwise noted.      Objective           Vitals:  No vitals were obtained today due to virtual visit.    Physical Exam   healthy, alert and no distress  PSYCH: Alert and oriented times 3; coherent speech, normal   rate and volume, able to articulate logical thoughts, able   to abstract reason, no tangential thoughts, no hallucinations   or delusions  Her affect is normal  RESP: No cough, no audible wheezing, able to talk in full sentences  Remainder of exam unable to be completed due to telephone visits          Phone call duration: 6 minutes

## 2022-01-11 ASSESSMENT — ANXIETY QUESTIONNAIRES: GAD7 TOTAL SCORE: 10

## 2022-02-08 ENCOUNTER — OFFICE VISIT (OUTPATIENT)
Dept: FAMILY MEDICINE | Facility: CLINIC | Age: 52
End: 2022-02-08
Payer: COMMERCIAL

## 2022-02-08 ENCOUNTER — HOSPITAL ENCOUNTER (OUTPATIENT)
Dept: MAMMOGRAPHY | Facility: CLINIC | Age: 52
Discharge: HOME | End: 2022-02-08
Attending: INTERNAL MEDICINE | Admitting: INTERNAL MEDICINE
Payer: COMMERCIAL

## 2022-02-08 VITALS
OXYGEN SATURATION: 98 % | BODY MASS INDEX: 28.16 KG/M2 | DIASTOLIC BLOOD PRESSURE: 84 MMHG | WEIGHT: 169 LBS | HEIGHT: 65 IN | SYSTOLIC BLOOD PRESSURE: 130 MMHG | HEART RATE: 86 BPM | RESPIRATION RATE: 20 BRPM | TEMPERATURE: 98.7 F

## 2022-02-08 DIAGNOSIS — Z13.1 ENCOUNTER FOR SCREENING EXAMINATION FOR IMPAIRED GLUCOSE REGULATION AND DIABETES MELLITUS: ICD-10-CM

## 2022-02-08 DIAGNOSIS — Z13.220 LIPID SCREENING: ICD-10-CM

## 2022-02-08 DIAGNOSIS — Z12.31 VISIT FOR SCREENING MAMMOGRAM: ICD-10-CM

## 2022-02-08 DIAGNOSIS — Z12.4 SCREENING FOR MALIGNANT NEOPLASM OF CERVIX: ICD-10-CM

## 2022-02-08 DIAGNOSIS — Z11.59 ENCOUNTER FOR HEPATITIS C SCREENING TEST FOR LOW RISK PATIENT: ICD-10-CM

## 2022-02-08 DIAGNOSIS — Z00.00 ROUTINE GENERAL MEDICAL EXAMINATION AT A HEALTH CARE FACILITY: Primary | ICD-10-CM

## 2022-02-08 PROCEDURE — 99396 PREV VISIT EST AGE 40-64: CPT | Performed by: INTERNAL MEDICINE

## 2022-02-08 PROCEDURE — 77067 SCR MAMMO BI INCL CAD: CPT

## 2022-02-08 PROCEDURE — 87624 HPV HI-RISK TYP POOLED RSLT: CPT | Performed by: INTERNAL MEDICINE

## 2022-02-08 PROCEDURE — G0145 SCR C/V CYTO,THINLAYER,RESCR: HCPCS | Performed by: INTERNAL MEDICINE

## 2022-02-08 ASSESSMENT — ENCOUNTER SYMPTOMS
HEADACHES: 0
PALPITATIONS: 0
FREQUENCY: 0
MYALGIAS: 0
COUGH: 0
HEMATURIA: 0
HEMATOCHEZIA: 0
CONSTIPATION: 0
JOINT SWELLING: 0
NAUSEA: 0
FEVER: 0
PARESTHESIAS: 0
SHORTNESS OF BREATH: 0
DIARRHEA: 0
SORE THROAT: 0
BREAST MASS: 0
ARTHRALGIAS: 0
DIZZINESS: 0
HEARTBURN: 0
CHILLS: 0
NERVOUS/ANXIOUS: 0
ABDOMINAL PAIN: 0
DYSURIA: 0
EYE PAIN: 0
WEAKNESS: 0

## 2022-02-08 ASSESSMENT — MIFFLIN-ST. JEOR: SCORE: 1383.49

## 2022-02-08 NOTE — PROGRESS NOTES
SUBJECTIVE:   CC: Vania Gee is an 52 year old woman who presents for preventive health visit.     Chief Complaint   Patient presents with     Physical     Health Maintenance     reminded due for colonoscopy, mammogram is scheduled, will check insurance for shingles vaccine, ACP info given     Flu Shot     declines        Patient has been advised of split billing requirements and indicates understanding: Yes  Healthy Habits:     Getting at least 3 servings of Calcium per day:  Yes    Bi-annual eye exam:  NO    Dental care twice a year:  Yes    Sleep apnea or symptoms of sleep apnea:  None    Diet:  Regular (no restrictions)    Frequency of exercise:  4-5 days/week    Duration of exercise:  15-30 minutes    Taking medications regularly:  Yes    Medication side effects:  Not applicable    PHQ-2 Total Score: 0    Additional concerns today:  No        Today's PHQ-2 Score:   PHQ-2 ( 1999 [employer]) 2/8/2022   Q1: Little interest or pleasure in doing things 0   Q2: Feeling down, depressed or hopeless 0   PHQ-2 Score 0   PHQ-2 Total Score (12-17 Years)- Positive if 3 or more points; Administer PHQ-A if positive -   Q1: Little interest or pleasure in doing things Not at all   Q2: Feeling down, depressed or hopeless Not at all   PHQ-2 Score 0       Abuse: Current or Past (Physical, Sexual or Emotional) - No  Do you feel safe in your environment? Yes    Have you ever done Advance Care Planning? (For example, a Health Directive, POLST, or a discussion with a medical provider or your loved ones about your wishes): No, advance care planning information given to patient to review.  Patient plans to discuss their wishes with loved ones or provider.      Social History     Tobacco Use     Smoking status: Never Smoker     Smokeless tobacco: Never Used   Substance Use Topics     Alcohol use: Yes     Comment: 1 PER WEEK     If you drink alcohol do you typically have >3 drinks per day or >7 drinks per week? No    Alcohol Use  2/8/2022   Prescreen: >3 drinks/day or >7 drinks/week? No   Prescreen: >3 drinks/day or >7 drinks/week? -       Reviewed orders with patient.  Reviewed health maintenance and updated orders accordingly - Yes      Breast Cancer Screening:    FHS-7:   Breast CA Risk Assessment (FHS-7) 2/8/2022   Did any of your first-degree relatives have breast or ovarian cancer? Unknown   Did any of your relatives have bilateral breast cancer? Yes   Did any man in your family have breast cancer? No   Did any woman in your family have breast and ovarian cancer? Yes   Did any woman in your family have breast cancer before age 50 y? No   Do you have 2 or more relatives with breast and/or ovarian cancer? No   Do you have 2 or more relatives with breast and/or bowel cancer? No     Pertinent mammograms are reviewed under the imaging tab.    History of abnormal Pap smear: positive HPV  PAP / HPV Latest Ref Rng & Units 2/6/2019 1/22/2018 3/20/2014   PAP (Historical) - NIL NIL NIL   HPV16 NEG:Negative Positive(A) Negative -   HPV18 NEG:Negative Negative Positive(A) -   HRHPV NEG:Negative Negative Negative -     Reviewed and updated as needed this visit by clinical staff  Tobacco  Allergies  Meds   Med Hx  Surg Hx  Fam Hx  Soc Hx       Reviewed and updated as needed this visit by Provider                 Review of Systems   Constitutional: Negative for chills and fever.   HENT: Negative for congestion, ear pain, hearing loss and sore throat.    Eyes: Negative for pain and visual disturbance.   Respiratory: Negative for cough and shortness of breath.    Cardiovascular: Negative for chest pain, palpitations and peripheral edema.   Gastrointestinal: Negative for abdominal pain, constipation, diarrhea, heartburn, hematochezia and nausea.   Breasts:  Negative for tenderness, breast mass and discharge.   Genitourinary: Negative for dysuria, frequency, genital sores, hematuria, pelvic pain, urgency, vaginal bleeding and vaginal discharge.  "  Musculoskeletal: Negative for arthralgias, joint swelling and myalgias.   Skin: Negative for rash.   Neurological: Negative for dizziness, weakness, headaches and paresthesias.   Psychiatric/Behavioral: Negative for mood changes. The patient is not nervous/anxious.           OBJECTIVE:   /84   Pulse 86   Temp 98.7  F (37.1  C) (Tympanic)   Resp 20   Ht 1.645 m (5' 4.75\")   Wt 76.7 kg (169 lb)   LMP 02/02/2021 (Approximate)   SpO2 98%   BMI 28.34 kg/m    Physical Exam  GENERAL: healthy, alert and no distress  EYES: Eyes grossly normal to inspection, PERRL and conjunctivae and sclerae normal  HENT: ear canals and TM's normal, nose and mouth without ulcers or lesions  NECK: no adenopathy, no asymmetry, masses, or scars and thyroid normal to palpation  RESP: lungs clear to auscultation - no rales, rhonchi or wheezes  BREAST: normal without masses, tenderness or nipple discharge and no palpable axillary masses or adenopathy  CV: regular rate and rhythm, normal S1 S2, no S3 or S4, no murmur, click or rub, no peripheral edema and peripheral pulses strong  ABDOMEN: soft, nontender, no hepatosplenomegaly, no masses and bowel sounds normal   (female): normal female external genitalia, normal urethral meatus , vaginal mucosa pink, moist, well rugated and normal cervix, Pap done  MS: no gross musculoskeletal defects noted, no edema  SKIN: no suspicious lesions or rashes  NEURO: Normal strength and tone, mentation intact and speech normal  PSYCH: mentation appears normal, affect normal/bright      ASSESSMENT/PLAN:   (Z00.00) Routine general medical examination at a health care facility  (primary encounter diagnosis)      Pap smear:  Done today    Immunizations: flu vax declined, going to check coverage for Shingrix    Lab Studies: as below    Mammogram: scheduled    Colonoscopy/FIT: discussed options and she is going to check insurance coverage    Advanced care planning: materials provided       (Z13.1) " "Encounter for screening examination for impaired glucose regulation and diabetes mellitus  Comment:   Plan: Glucose            (Z13.220) Lipid screening  Comment:   Plan: Lipid panel reflex to direct LDL Fasting            (Z12.4) Screening for malignant neoplasm of cervix  Comment:   Plan: Pap screen with HPV - recommended age 30 - 65         years            (Z11.59) Encounter for hepatitis C screening test for low risk patient  Comment:   Plan: Hepatitis C Screen Reflex to HCV RNA Quant and         Genotype            COUNSELING:  Reviewed preventive health counseling, as reflected in patient instructions    Estimated body mass index is 28.34 kg/m  as calculated from the following:    Height as of this encounter: 1.645 m (5' 4.75\").    Weight as of this encounter: 76.7 kg (169 lb).    Weight management plan: exercise and diet guidelines provided in avs    She reports that she has never smoked. She has never used smokeless tobacco.      Shay Olmos MD  [facility]  "

## 2022-02-08 NOTE — PATIENT INSTRUCTIONS
I'd recommend checking with your insurance to see if they cover the new shingles vaccine, Shingrix.  This vaccine is much more effective than the older shingles vaccine.  Some insurances only cover this if you get it at the pharmacy rather than the clinic, so either check on this or just plan to get the vaccine at a pharmacy.      Options for colon cancer screening: FIT and Cologaurd stool tests and colonoscopy.      Double check if hepatitis C testing is covered.        Preventive Health Recommendations  Female Ages 50 - 64    Yearly exam: See your health care provider every year in order to  o Review health changes.   o Discuss preventive care.    o Review your medicines if your doctor has prescribed any.      Get a Pap test every three years (unless you have an abnormal result and your provider advises testing more often).    If you get Pap tests with HPV test, you only need to test every 5 years, unless you have an abnormal result.     You do not need a Pap test if your uterus was removed (hysterectomy) and you have not had cancer.    You should be tested each year for STDs (sexually transmitted diseases) if you're at risk.     Have a mammogram every 1 to 2 years.    Have a colonoscopy at age 50, or have a yearly FIT test (stool test). These exams screen for colon cancer.      Have a cholesterol test every 5 years, or more often if advised.    Have a diabetes test (fasting glucose) every three years. If you are at risk for diabetes, you should have this test more often.     If you are at risk for osteoporosis (brittle bone disease), think about having a bone density scan (DEXA).    Shots: Get a flu shot each year. Get a tetanus shot every 10 years.    Nutrition:     Eat at least 5 servings of fruits and vegetables each day.    Eat whole-grain bread, whole-wheat pasta and brown rice instead of white grains and rice.    Get adequate Calcium and Vitamin D.     Lifestyle    Exercise at least 150 minutes a week (30  minutes a day, 5 days a week). This will help you control your weight and prevent disease.    Limit alcohol to one drink per day.    No smoking.     Wear sunscreen to prevent skin cancer.     See your dentist every six months for an exam and cleaning.    See your eye doctor every 1 to 2 years.

## 2022-02-11 LAB
BKR LAB AP GYN ADEQUACY: NORMAL
BKR LAB AP GYN INTERPRETATION: NORMAL
BKR LAB AP HPV REFLEX: NORMAL
BKR LAB AP PREVIOUS ABNL DX: NORMAL
BKR LAB AP PREVIOUS ABNORMAL: NORMAL
PATH REPORT.COMMENTS IMP SPEC: NORMAL
PATH REPORT.COMMENTS IMP SPEC: NORMAL
PATH REPORT.RELEVANT HX SPEC: NORMAL

## 2022-02-14 LAB
HUMAN PAPILLOMA VIRUS 16 DNA: NEGATIVE
HUMAN PAPILLOMA VIRUS 18 DNA: NEGATIVE
HUMAN PAPILLOMA VIRUS FINAL DIAGNOSIS: NORMAL
HUMAN PAPILLOMA VIRUS OTHER HR: NEGATIVE

## 2022-04-05 ENCOUNTER — LAB (OUTPATIENT)
Dept: LAB | Facility: CLINIC | Age: 52
End: 2022-04-05
Payer: COMMERCIAL

## 2022-04-05 DIAGNOSIS — E03.8 SUBCLINICAL HYPOTHYROIDISM: ICD-10-CM

## 2022-04-05 DIAGNOSIS — Z13.1 ENCOUNTER FOR SCREENING EXAMINATION FOR IMPAIRED GLUCOSE REGULATION AND DIABETES MELLITUS: ICD-10-CM

## 2022-04-05 DIAGNOSIS — Z11.59 ENCOUNTER FOR HEPATITIS C SCREENING TEST FOR LOW RISK PATIENT: ICD-10-CM

## 2022-04-05 DIAGNOSIS — Z13.220 LIPID SCREENING: ICD-10-CM

## 2022-04-05 LAB
CHOLEST SERPL-MCNC: 206 MG/DL
FASTING STATUS PATIENT QL REPORTED: NO
FASTING STATUS PATIENT QL REPORTED: NO
GLUCOSE BLD-MCNC: 82 MG/DL (ref 70–99)
HDLC SERPL-MCNC: 78 MG/DL
LDLC SERPL CALC-MCNC: 111 MG/DL
NONHDLC SERPL-MCNC: 128 MG/DL
T4 FREE SERPL-MCNC: 1.76 NG/DL (ref 0.76–1.46)
TRIGL SERPL-MCNC: 84 MG/DL
TSH SERPL DL<=0.005 MIU/L-ACNC: 0.01 MU/L (ref 0.4–4)

## 2022-04-05 PROCEDURE — 82947 ASSAY GLUCOSE BLOOD QUANT: CPT

## 2022-04-05 PROCEDURE — 84443 ASSAY THYROID STIM HORMONE: CPT

## 2022-04-05 PROCEDURE — 36415 COLL VENOUS BLD VENIPUNCTURE: CPT

## 2022-04-05 PROCEDURE — 84439 ASSAY OF FREE THYROXINE: CPT

## 2022-04-05 PROCEDURE — 80061 LIPID PANEL: CPT

## 2022-04-05 RX ORDER — LEVOTHYROXINE SODIUM 112 UG/1
112 TABLET ORAL DAILY
Qty: 90 TABLET | Refills: 0 | Status: SHIPPED | OUTPATIENT
Start: 2022-04-05 | End: 2022-08-16

## 2022-04-25 ENCOUNTER — TELEPHONE (OUTPATIENT)
Dept: FAMILY MEDICINE | Facility: CLINIC | Age: 52
End: 2022-04-25
Payer: COMMERCIAL

## 2022-04-25 NOTE — TELEPHONE ENCOUNTER
Patient Quality Outreach    Patient is due for the following:   Colon Cancer Screening -  Colonoscopy    NEXT STEPS:   schedule colon cancer screening    Type of outreach:    Sent letter.      Questions for provider review:    None     Christina Handley, CMA

## 2022-04-25 NOTE — LETTER
April 25, 2022      Vania Gee  [address] [place] [address]        Dear Vania,     Your healthcare team cares about your health. To provide you with the best care,   we have reviewed your chart and based on our findings, we see that you are due to:     - COLON CANCER SCREENING:  Call or mychart the clinic to schedule your colonoscopy or schedule/ your FIT Test, or Cologuard test    If you have already completed these items, please contact the clinic via phone or   Mychart so your care team can review and update your records. Thank you for   choosing [facility] for your healthcare needs. For any questions,   concerns, or to schedule an appointment please contact the clinic.       Healthy Regards,      Your [facility] Care Team

## 2022-08-16 ENCOUNTER — LAB (OUTPATIENT)
Dept: LAB | Facility: CLINIC | Age: 52
End: 2022-08-16
Payer: COMMERCIAL

## 2022-08-16 DIAGNOSIS — E03.8 SUBCLINICAL HYPOTHYROIDISM: ICD-10-CM

## 2022-08-16 LAB
T4 FREE SERPL-MCNC: 1.31 NG/DL (ref 0.76–1.46)
TSH SERPL DL<=0.005 MIU/L-ACNC: 0.29 MU/L (ref 0.4–4)

## 2022-08-16 PROCEDURE — 84443 ASSAY THYROID STIM HORMONE: CPT

## 2022-08-16 PROCEDURE — 36415 COLL VENOUS BLD VENIPUNCTURE: CPT

## 2022-08-16 PROCEDURE — 84439 ASSAY OF FREE THYROXINE: CPT

## 2022-08-16 RX ORDER — LEVOTHYROXINE SODIUM 100 UG/1
100 TABLET ORAL DAILY
Qty: 90 TABLET | Refills: 0 | Status: SHIPPED | OUTPATIENT
Start: 2022-08-16 | End: 2022-11-17

## 2022-11-15 DIAGNOSIS — E03.8 SUBCLINICAL HYPOTHYROIDISM: ICD-10-CM

## 2022-11-17 ENCOUNTER — TELEPHONE (OUTPATIENT)
Dept: PEDIATRICS | Facility: CLINIC | Age: 52
End: 2022-11-17

## 2022-11-17 RX ORDER — LEVOTHYROXINE SODIUM 100 UG/1
100 TABLET ORAL DAILY
Qty: 30 TABLET | Refills: 0 | Status: SHIPPED | OUTPATIENT
Start: 2022-11-17 | End: 2022-12-01

## 2022-11-17 NOTE — TELEPHONE ENCOUNTER
Last Clinic Visit: 2/8/2022  [facility]  Pt last seen by Dr Escobar   Now thyroid followed by Dr Wise   Last TSH done 8/16/22-  Lab Test 08/16/22  1103   TSH 0.29*     note states..Based on labs, discussed with patient by phone to recommend a lower dose to levothyroxine 100mcg daily. Sent to pharmacy. Recommend recheck of TSH again after 6 weeks, ordered.          Pt scheduled for recheck on 11/22/22    30 day bonifacio refill sent to the pharmacy - until new TSh is read..

## 2022-11-17 NOTE — TELEPHONE ENCOUNTER
Reason for Call:  Other appointment    Detailed comments: Patient wasn't sure who had placed the order for labwork to monitor her thyroid, but she made an appt for 11/22 for herself and her daughter and wants to make sure the order is placed    Phone Number Patient can be reached at: Home number on file [phone] (home)    Can we leave a detailed message on this number? YES    Call taken on 11/17/2022 at 11:33 AM by Karishma Meyers

## 2022-11-21 ENCOUNTER — HEALTH MAINTENANCE LETTER (OUTPATIENT)
Age: 52
End: 2022-11-21

## 2022-12-01 ENCOUNTER — LAB (OUTPATIENT)
Dept: LAB | Facility: CLINIC | Age: 52
End: 2022-12-01
Payer: COMMERCIAL

## 2022-12-01 DIAGNOSIS — E03.8 SUBCLINICAL HYPOTHYROIDISM: ICD-10-CM

## 2022-12-01 LAB — TSH SERPL DL<=0.005 MIU/L-ACNC: 0.73 UIU/ML (ref 0.3–4.2)

## 2022-12-01 PROCEDURE — 36415 COLL VENOUS BLD VENIPUNCTURE: CPT

## 2022-12-01 PROCEDURE — 84443 ASSAY THYROID STIM HORMONE: CPT

## 2022-12-01 RX ORDER — LEVOTHYROXINE SODIUM 100 UG/1
100 TABLET ORAL DAILY
Qty: 90 TABLET | Refills: 0 | Status: SHIPPED | OUTPATIENT
Start: 2022-12-01 | End: 2022-12-14

## 2022-12-14 ENCOUNTER — TELEPHONE (OUTPATIENT)
Dept: INTERNAL MEDICINE | Facility: CLINIC | Age: 52
End: 2022-12-14

## 2022-12-14 DIAGNOSIS — E03.8 SUBCLINICAL HYPOTHYROIDISM: ICD-10-CM

## 2022-12-14 RX ORDER — LEVOTHYROXINE SODIUM 100 UG/1
100 TABLET ORAL DAILY
Qty: 90 TABLET | Refills: 0 | Status: SHIPPED | OUTPATIENT
Start: 2022-12-14 | End: 2023-03-21

## 2022-12-14 NOTE — TELEPHONE ENCOUNTER
Dr Olmos sent levothyroxine on 12/1/22 to [facility].  Pt has asked to have it transferred to [facility].  [facility] told pt to have Dr Olmos's office do this.    Re-sent to [facility].    Patricia Lugo RN   [facility]

## 2023-03-21 ENCOUNTER — MYC REFILL (OUTPATIENT)
Dept: INTERNAL MEDICINE | Facility: CLINIC | Age: 53
End: 2023-03-21
Payer: COMMERCIAL

## 2023-03-21 DIAGNOSIS — E03.8 SUBCLINICAL HYPOTHYROIDISM: ICD-10-CM

## 2023-03-21 RX ORDER — LEVOTHYROXINE SODIUM 100 UG/1
100 TABLET ORAL DAILY
Qty: 90 TABLET | Refills: 0 | Status: SHIPPED | OUTPATIENT
Start: 2023-03-21 | End: 2023-04-05

## 2023-03-21 NOTE — TELEPHONE ENCOUNTER
Vania would like to schedule follow-up at the [facility]; could someone please contact her to schedule with an available provider?    Thanks,  Shay Olmos MD

## 2023-03-21 NOTE — TELEPHONE ENCOUNTER
levothyroxine (SYNTHROID/LEVOTHROID) 100 MCG tablet  Last Written Prescription Date:  12/14/22  Last Fill Quantity: 90,   # refills: 0  Last Office Visit : 2/8/22  Future Office visit: none    Scheduling has been notified to contact the pt for appointment.

## 2023-04-05 ENCOUNTER — OFFICE VISIT (OUTPATIENT)
Dept: FAMILY MEDICINE | Facility: CLINIC | Age: 53
End: 2023-04-05
Payer: COMMERCIAL

## 2023-04-05 VITALS
SYSTOLIC BLOOD PRESSURE: 136 MMHG | HEART RATE: 73 BPM | TEMPERATURE: 98.5 F | DIASTOLIC BLOOD PRESSURE: 84 MMHG | HEIGHT: 65 IN | BODY MASS INDEX: 26.92 KG/M2 | RESPIRATION RATE: 16 BRPM | WEIGHT: 161.6 LBS | OXYGEN SATURATION: 99 %

## 2023-04-05 DIAGNOSIS — Z00.00 ANNUAL PHYSICAL EXAM: Primary | ICD-10-CM

## 2023-04-05 DIAGNOSIS — E78.5 HYPERLIPIDEMIA LDL GOAL <100: ICD-10-CM

## 2023-04-05 DIAGNOSIS — E03.8 SUBCLINICAL HYPOTHYROIDISM: ICD-10-CM

## 2023-04-05 DIAGNOSIS — Z12.31 VISIT FOR SCREENING MAMMOGRAM: ICD-10-CM

## 2023-04-05 LAB
CHOLEST SERPL-MCNC: 223 MG/DL
FASTING STATUS PATIENT QL REPORTED: YES
GLUCOSE SERPL-MCNC: 94 MG/DL (ref 70–99)
HDLC SERPL-MCNC: 80 MG/DL
LDLC SERPL CALC-MCNC: 125 MG/DL
NONHDLC SERPL-MCNC: 143 MG/DL
TRIGL SERPL-MCNC: 88 MG/DL
TSH SERPL DL<=0.005 MIU/L-ACNC: 1.28 UIU/ML (ref 0.3–4.2)

## 2023-04-05 PROCEDURE — 36415 COLL VENOUS BLD VENIPUNCTURE: CPT | Performed by: NURSE PRACTITIONER

## 2023-04-05 PROCEDURE — 99396 PREV VISIT EST AGE 40-64: CPT | Performed by: NURSE PRACTITIONER

## 2023-04-05 PROCEDURE — 80061 LIPID PANEL: CPT | Performed by: NURSE PRACTITIONER

## 2023-04-05 PROCEDURE — 82947 ASSAY GLUCOSE BLOOD QUANT: CPT | Performed by: NURSE PRACTITIONER

## 2023-04-05 PROCEDURE — 99213 OFFICE O/P EST LOW 20 MIN: CPT | Mod: 25 | Performed by: NURSE PRACTITIONER

## 2023-04-05 PROCEDURE — 84443 ASSAY THYROID STIM HORMONE: CPT | Performed by: NURSE PRACTITIONER

## 2023-04-05 RX ORDER — LEVOTHYROXINE SODIUM 100 UG/1
100 TABLET ORAL DAILY
Qty: 90 TABLET | Refills: 3 | Status: SHIPPED | OUTPATIENT
Start: 2023-04-05 | End: 2023-06-19

## 2023-04-05 ASSESSMENT — ENCOUNTER SYMPTOMS
MYALGIAS: 0
BREAST MASS: 0
DIARRHEA: 0
HEMATOCHEZIA: 0
WEAKNESS: 0
COUGH: 0
DIZZINESS: 0
JOINT SWELLING: 0
PALPITATIONS: 0
DYSURIA: 0
SORE THROAT: 0
ARTHRALGIAS: 0
HEMATURIA: 0
FREQUENCY: 0
FEVER: 0
SHORTNESS OF BREATH: 0
CHILLS: 0
HEARTBURN: 0
HEADACHES: 0
ABDOMINAL PAIN: 0
NAUSEA: 0
NERVOUS/ANXIOUS: 0
EYE PAIN: 0
PARESTHESIAS: 0
CONSTIPATION: 0

## 2023-04-05 ASSESSMENT — PAIN SCALES - GENERAL: PAINLEVEL: NO PAIN (0)

## 2023-04-05 NOTE — PROGRESS NOTES
Assessment & Plan     Annual physical exam    - Lipid panel reflex to direct LDL Fasting; Future  - Glucose; Future  - Glucose  - Lipid panel reflex to direct LDL Fasting    Visit for screening mammogram    - REVIEW OF HEALTH MAINTENANCE PROTOCOL ORDERS  - MA SCREENING DIGITAL BILAT - Future  (s+30); Future    Subclinical hypothyroidism  -clinically euthyroid, continue Synthroid 100 mcg daily, thyroid levels pending, will plan to recheck again in 6 months. Patient prefers call about lab results vs my chart    - levothyroxine (SYNTHROID/LEVOTHROID) 100 MCG tablet; Take 1 tablet (100 mcg) by mouth daily  - TSH with free T4 reflex; Future  - TSH with free T4 reflex  - TSH with free T4 reflex; Future      JOHNNIE Arteaga CNP  [facility]    Lilibeth Caro is a 53 year old, presenting for the following health issues:  Physical    Healthy Habits:     Getting at least 3 servings of Calcium per day:  Yes    Bi-annual eye exam:  NO    Dental care twice a year:  Yes    Sleep apnea or symptoms of sleep apnea:  Excessive snoring    Diet:  Regular (no restrictions)    Frequency of exercise:  2-3 days/week    Duration of exercise:  15-30 minutes    Taking medications regularly:  Yes    Medication side effects:  None    PHQ-2 Total Score: 0    Additional concerns today:  No     Hypothyroidism Follow-up      Since last visit, patient describes the following symptoms: Weight stable, no hair loss, no skin changes, no constipation, no loose stools    Review of Systems   Constitutional: Negative for chills and fever.   HENT: Negative for congestion, ear pain, hearing loss and sore throat.    Eyes: Negative for pain.   Respiratory: Negative for cough and shortness of breath.    Cardiovascular: Negative for chest pain, palpitations and peripheral edema.   Gastrointestinal: Negative for abdominal pain, constipation, diarrhea, heartburn, hematochezia and nausea.   Breasts:  Negative for tenderness, breast  "mass and discharge.   Genitourinary: Negative for dysuria, frequency, genital sores, hematuria, pelvic pain, urgency, vaginal bleeding and vaginal discharge.   Musculoskeletal: Negative for arthralgias, joint swelling and myalgias.   Skin: Negative for rash.   Neurological: Negative for dizziness, weakness, headaches and paresthesias.   Psychiatric/Behavioral: Negative for mood changes. The patient is not nervous/anxious.             Objective    /84 (BP Location: Left arm, Patient Position: Sitting, Cuff Size: Adult Regular)   Pulse 73   Temp 98.5  F (36.9  C) (Tympanic)   Resp 16   Ht 1.645 m (5' 4.75\")   Wt 73.3 kg (161 lb 9.6 oz)   LMP 02/02/2021 (Approximate)   SpO2 99%   BMI 27.10 kg/m    Body mass index is 27.1 kg/m .  Physical Exam   GENERAL: healthy, alert and no distress  EYES: Eyes grossly normal to inspection, PERRL and conjunctivae and sclerae normal  HENT: ear canals and TM's normal, nose and mouth without ulcers or lesions  NECK: no adenopathy, no asymmetry, masses, or scars and thyroid normal to palpation  RESP: lungs clear to auscultation - no rales, rhonchi or wheezes  CV: regular rate and rhythm, normal S1 S2, no S3 or S4, no murmur, click or rub, no peripheral edema and peripheral pulses strong  MS: no gross musculoskeletal defects noted, no edema  NEURO: Normal strength and tone, mentation intact and speech normal  PSYCH: mentation appears normal, affect normal/bright              "

## 2023-04-16 ENCOUNTER — HEALTH MAINTENANCE LETTER (OUTPATIENT)
Age: 53
End: 2023-04-16

## 2023-04-24 ENCOUNTER — ANCILLARY ORDERS (OUTPATIENT)
Dept: FAMILY MEDICINE | Facility: CLINIC | Age: 53
End: 2023-04-24

## 2023-04-24 ENCOUNTER — HOSPITAL ENCOUNTER (OUTPATIENT)
Dept: MAMMOGRAPHY | Facility: CLINIC | Age: 53
Discharge: HOME | End: 2023-04-24
Attending: NURSE PRACTITIONER | Admitting: NURSE PRACTITIONER
Payer: COMMERCIAL

## 2023-04-24 DIAGNOSIS — Z12.31 VISIT FOR SCREENING MAMMOGRAM: ICD-10-CM

## 2023-04-24 PROCEDURE — 77067 SCR MAMMO BI INCL CAD: CPT

## 2023-06-19 ENCOUNTER — MYC MEDICAL ADVICE (OUTPATIENT)
Dept: FAMILY MEDICINE | Facility: CLINIC | Age: 53
End: 2023-06-19
Payer: COMMERCIAL

## 2023-06-19 DIAGNOSIS — E03.8 SUBCLINICAL HYPOTHYROIDISM: ICD-10-CM

## 2023-06-19 RX ORDER — LEVOTHYROXINE SODIUM 100 UG/1
100 TABLET ORAL DAILY
Qty: 90 TABLET | Refills: 2 | Status: SHIPPED | OUTPATIENT
Start: 2023-06-19 | End: 2024-06-24

## 2023-06-19 NOTE — TELEPHONE ENCOUNTER
"Prescription approved per [facility] Refill Protocol.    Pending Prescriptions:                       Disp   Refills    levothyroxine (SYNTHROID/LEVOTHROID) 100 *90 tab*2            Sig: Take 1 tablet (100 mcg) by mouth daily      Requested Prescriptions   Pending Prescriptions Disp Refills     levothyroxine (SYNTHROID/LEVOTHROID) 100 MCG tablet 90 tablet 2     Sig: Take 1 tablet (100 mcg) by mouth daily       Thyroid Protocol Passed - 6/19/2023 11:55 AM        Passed - Patient is 12 years or older        Passed - Recent (12 mo) or future (30 days) visit within the authorizing provider's specialty     Patient has had an office visit with the authorizing provider or a provider within the authorizing providers department within the previous 12 mos or has a future within next 30 days. See \"Patient Info\" tab in inbasket, or \"Choose Columns\" in Meds & Orders section of the refill encounter.              Passed - Medication is active on med list        Passed - Normal TSH on file in past 12 months     Recent Labs   Lab Test 04/05/23  0923   TSH 1.28              Passed - No active pregnancy on record     If patient is pregnant or has had a positive pregnancy test, please check TSH.          Passed - No positive pregnancy test in past 12 months     If patient is pregnant or has had a positive pregnancy test, please check TSH.             Kandice Gee RN on 6/19/2023 at 1:56 PM    "

## 2023-06-19 NOTE — TELEPHONE ENCOUNTER
Medication Question or Refill        What medication are you calling about (include dose and sig)?:levothyroxine (SYNTHROID/LEVOTHROID 100 MCG TABLET    Preferred Pharmacy:   [facility] [address]   [address] [facility] [address]  Phone: [phone] Fax: [phone]      Controlled Substance Agreement on file:   CSA -- Patient Level:    CSA: None found at the patient level.       Who prescribed the medication?: FORMOGEY    Do you need a refill? Yes    When did you use the medication last? TODAY    Patient offered an appointment? No    Do you have any questions or concerns?  No      Could we send this information to you in [facility] or would you prefer to receive a phone call?:   Patient would prefer a phone call   Okay to leave a detailed message?: Yes at Home number on file [phone] (home)

## 2023-11-07 ENCOUNTER — LAB (OUTPATIENT)
Dept: LAB | Facility: CLINIC | Age: 53
End: 2023-11-07
Payer: COMMERCIAL

## 2023-11-07 DIAGNOSIS — E03.8 SUBCLINICAL HYPOTHYROIDISM: ICD-10-CM

## 2023-11-07 LAB — TSH SERPL DL<=0.005 MIU/L-ACNC: 3.59 UIU/ML (ref 0.3–4.2)

## 2023-11-07 PROCEDURE — 36415 COLL VENOUS BLD VENIPUNCTURE: CPT

## 2023-11-07 PROCEDURE — 84443 ASSAY THYROID STIM HORMONE: CPT

## 2023-11-08 ENCOUNTER — NURSE TRIAGE (OUTPATIENT)
Dept: FAMILY MEDICINE | Facility: CLINIC | Age: 53
End: 2023-11-08
Payer: COMMERCIAL

## 2023-11-08 NOTE — TELEPHONE ENCOUNTER
Symptoms    Describe your symptoms: Patient rolled her left ankle and heard a pop. She has  terrible insurance and wants to talk to a nurse with questions about when she needs medical attention    Any pain: No    How long have you been having symptoms: Yesterday, 11/7/23      Have you been seen for this:  No    Appointment offered?: Yes: Declined    Triage offered?: Yes:     Home remedies tried: None    Preferred Pharmacy:     [facility] [address] [place] [address] [address] [place] [address] [address]  Phone: [phone] Fax: [phone]      Could we send this information to you in [facility] or would you prefer to receive a phone call?:   Patient would prefer a phone call   Okay to leave a detailed message?: Yes at Home number on file [phone] (home)

## 2023-11-09 NOTE — TELEPHONE ENCOUNTER
Unable to leave a message, VM box full. Cloudamizehart message sent.    Karyna Joseph RN  [facility]

## 2023-11-10 NOTE — TELEPHONE ENCOUNTER
"Nurse Triage SBAR    Is this a 2nd Level Triage? 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    Assessment: pt able to bear weight, walk but has pain. Left foot & ankle swollen & ankle bruised. Pt not able to wear her own shoes, needs to wear larger shoe. \"Looks like a fat ankle\", can see where ankle bone should be. No break in skin. Has used ice, wrapped ankle & taking advil. Can flex foot without pain. When moving leg side to side, feels a \"clicking\".    Protocol Recommended Disposition:   See in Office Today  No appt's. Pt advised UC. Pt states has \"horrible insurance\" & will need to talk to her  but does understand she needs to be seen today.         Advised UC, no appt's. Pt will need to talk to     Does the patient meet one of the following criteria for ADS visit consideration? No     Reason for Disposition   A 'snap' or 'pop' was heard at the time of injury   Large swelling or bruise and size > palm of person's hand    Answer Assessment - Initial Assessment Questions  1. 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 Left ankle  2. ONSET: \"When did the injury happen?\" (Minutes or hours ago)       11/7  3. LOCATION: \"Where is the injury located?\"       Left ankle  4. APPEARANCE of INJURY: \"What does the injury look like?\"       Left foot sl swelling, ankle swelling. No swelling in leg  5. WEIGHT-BEARING: \"Can you put weight on that foot?\" \"Can you walk (four steps or more)?\"        Yes, can bear weight, walk. Hurts but able to bear weight. Able to walk all over. Taking advil. End of day after walking for 4hrs was swollen  6. SIZE: For cuts, bruises, or swelling, ask: \"How large is it?\" (e.g., inches or centimeters;  entire joint)       Ankle & foot  7. " "PAIN: \"Is there pain?\" If Yes, ask: \"How bad is the pain?\"    (e.g., Scale 1-10; or mild, moderate, severe)    - NONE (0): no pain.    - MILD (1-3): doesn't interfere with normal activities.     - MODERATE (4-7): interferes with normal activities (e.g., work or school) or awakens from sleep, limping.     - SEVERE (8-10): excruciating pain, unable to do any normal activities, unable to walk.       Able to do normal activities. Can flex foot. When moving leg sideways feels a \"click\". Doesn't hurt  8. TETANUS: For any breaks in the skin, ask: \"When was the last tetanus booster?\"      No break in skin  9. OTHER SYMPTOMS: \"Do you have any other symptoms?\"       no  10. PREGNANCY: \"Is there any chance you are pregnant?\" \"When was your last menstrual period?\"        N/a    Protocols used: Ankle and Foot Injury-A-[place]    "

## 2024-06-23 ENCOUNTER — HEALTH MAINTENANCE LETTER (OUTPATIENT)
Age: 54
End: 2024-06-23

## 2024-06-24 DIAGNOSIS — E03.8 SUBCLINICAL HYPOTHYROIDISM: ICD-10-CM

## 2024-06-24 RX ORDER — LEVOTHYROXINE SODIUM 100 UG/1
100 TABLET ORAL DAILY
Qty: 90 TABLET | Refills: 0 | Status: SHIPPED | OUTPATIENT
Start: 2024-06-24 | End: 2024-07-17

## 2024-06-24 NOTE — TELEPHONE ENCOUNTER
Has a provider visit scheduled on 7/17/24, bonifacio refill provided.    Karyna Joseph RN  [facility]

## 2024-06-24 NOTE — TELEPHONE ENCOUNTER
FYI - Status Update    Who is Calling: patient    Update: patient was told they were not able to get a refill of the levothyroxine because they needed to have an appointment, patient has a physical scheduled - please call in order as patient has 3 pills left      Does caller want a call/response back: Yes     Could we send this information to you in [place] or would you prefer to receive a phone call?:   Patient would prefer a phone call   Okay to leave a detailed message?: Yes at Home number on file [phone] (home)

## 2024-07-17 ENCOUNTER — OFFICE VISIT (OUTPATIENT)
Dept: FAMILY MEDICINE | Facility: CLINIC | Age: 54
End: 2024-07-17
Payer: COMMERCIAL

## 2024-07-17 VITALS
HEART RATE: 66 BPM | TEMPERATURE: 97.2 F | RESPIRATION RATE: 20 BRPM | DIASTOLIC BLOOD PRESSURE: 86 MMHG | SYSTOLIC BLOOD PRESSURE: 136 MMHG | BODY MASS INDEX: 28.59 KG/M2 | WEIGHT: 171.6 LBS | HEIGHT: 65 IN | OXYGEN SATURATION: 98 %

## 2024-07-17 DIAGNOSIS — Z11.59 NEED FOR HEPATITIS C SCREENING TEST: ICD-10-CM

## 2024-07-17 DIAGNOSIS — Z00.00 ROUTINE GENERAL MEDICAL EXAMINATION AT A HEALTH CARE FACILITY: Primary | ICD-10-CM

## 2024-07-17 DIAGNOSIS — E03.8 SUBCLINICAL HYPOTHYROIDISM: ICD-10-CM

## 2024-07-17 DIAGNOSIS — Z13.1 SCREENING FOR DIABETES MELLITUS: ICD-10-CM

## 2024-07-17 DIAGNOSIS — Z13.220 LIPID SCREENING: ICD-10-CM

## 2024-07-17 DIAGNOSIS — Z12.11 SCREEN FOR COLON CANCER: ICD-10-CM

## 2024-07-17 DIAGNOSIS — Z12.31 ENCOUNTER FOR SCREENING MAMMOGRAM FOR BREAST CANCER: ICD-10-CM

## 2024-07-17 PROCEDURE — 90471 IMMUNIZATION ADMIN: CPT | Performed by: FAMILY MEDICINE

## 2024-07-17 PROCEDURE — 99213 OFFICE O/P EST LOW 20 MIN: CPT | Mod: 25 | Performed by: FAMILY MEDICINE

## 2024-07-17 PROCEDURE — 99396 PREV VISIT EST AGE 40-64: CPT | Mod: 25 | Performed by: FAMILY MEDICINE

## 2024-07-17 PROCEDURE — 90715 TDAP VACCINE 7 YRS/> IM: CPT | Performed by: FAMILY MEDICINE

## 2024-07-17 RX ORDER — LEVOTHYROXINE SODIUM 100 UG/1
100 TABLET ORAL DAILY
Qty: 90 TABLET | Refills: 0 | Status: SHIPPED | OUTPATIENT
Start: 2024-07-17

## 2024-07-17 SDOH — HEALTH STABILITY: PHYSICAL HEALTH: ON AVERAGE, HOW MANY DAYS PER WEEK DO YOU ENGAGE IN MODERATE TO STRENUOUS EXERCISE (LIKE A BRISK WALK)?: 4 DAYS

## 2024-07-17 ASSESSMENT — PAIN SCALES - GENERAL: PAINLEVEL: NO PAIN (0)

## 2024-07-17 ASSESSMENT — SOCIAL DETERMINANTS OF HEALTH (SDOH): HOW OFTEN DO YOU GET TOGETHER WITH FRIENDS OR RELATIVES?: ONCE A WEEK

## 2024-07-17 NOTE — PATIENT INSTRUCTIONS
Patient Education     To schedule the mammogram, call [phone].     Colonoscopy  will call you in the next 3-5 business days to set up appointment for the procedure. Referral information is in your visit summary also.  You may schedule it at your convenience within the next 12 months.    Thyroid test to be done in the fall together with fasting lab tests..  Continue levothyroxine for now.    Be consistent with low salt, low trans fat and low saturated fat diet.  Eat food rich in omega-3-fatty acids as you tolerate. (salmon, olive oil)  Eat 5 cups of vegetables, fruits and whole grains per day.  Limit starchy food (white rice, white bread, white pasta, white potatoes) to less than a cup per meal.  Minimize sweets, junk food and fastfood. Limit soda beverages to one serving per day; best to avoid it altogether though.    Exercise: moderate intensity sustained for at least 30 mins per episode, goal of 150 mins per week at least  Combine cardiovascular and resistance exercises.  These exercise recommendations are in addition to your daily activity at work or home.  Work on losing weight.    Get flu and covid vaccines in the fall.  You may get the Shingrix vaccine for shingles if you desire, and after you verify with insurance how they cover the vaccine.   Plan to get hepatitis B immunization series if you have not received it or unsure if you have completed it before.      Preventative Care Visits include: Yearly physicals, Well-child visits, Welcome to Medicare visits, & Medicare yearly wellness exams.    The purpose of these visits is to discuss your medical history and prevent health problems before you are sick.  You may need to pay a copay, coinsurance or deductible if your visit today includes testing or treating for a new or existing condition.    Additional charges may be incurred for today's visit. If you have questions about what your insurance plan covers, please contact your Insurance Company's  member service department.  If you have questions specific to a bill you have already received from [employer] please contact the [employer] Billing Office at [phone].    Preventive Care Advice   This is general advice given by our system to help you stay healthy. However, your care team may have specific advice just for you. Please talk to your care team about your preventive care needs.  Nutrition  Eat 5 or more servings of fruits and vegetables each day.  Try wheat bread, brown rice and whole grain pasta (instead of white bread, rice, and pasta).  Get enough calcium and vitamin D. Check the label on foods and aim for 100% of the RDA (recommended daily allowance).  Lifestyle  Exercise at least 150 minutes each week  (30 minutes a day, 5 days a week).  Do muscle strengthening activities 2 days a week. These help control your weight and prevent disease.  No smoking.  Wear sunscreen to prevent skin cancer.  Have a dental exam and cleaning every 6 months.  Yearly exams  See your health care team every year to talk about:  Any changes in your health.  Any medicines your care team has prescribed.  Preventive care, family planning, and ways to prevent chronic diseases.  Shots (vaccines)   HPV shots (up to age 26), if you've never had them before.  Hepatitis B shots (up to age 59), if you've never had them before.  COVID-19 shot: Get this shot when it's due.  Flu shot: Get a flu shot every year.  Tetanus shot: Get a tetanus shot every 10 years.  Pneumococcal, hepatitis A, and RSV shots: Ask your care team if you need these based on your risk.  Shingles shot (for age 50 and up)  General health tests  Diabetes screening:  Starting at age 35, Get screened for diabetes at least every 3 years.  If you are younger than age 35, ask your care team if you should be screened for diabetes.  Cholesterol test: At age 39, start having a cholesterol test every 5 years, or more often if advised.  Bone density scan (DEXA): At age 50,  ask your care team if you should have this scan for osteoporosis (brittle bones).  Hepatitis C: Get tested at least once in your life.  STIs (sexually transmitted infections)  Before age 24: Ask your care team if you should be screened for STIs.  After age 24: Get screened for STIs if you're at risk. You are at risk for STIs (including HIV) if:  You are sexually active with more than one person.  You don't use condoms every time.  You or a partner was diagnosed with a sexually transmitted infection.  If you are at risk for HIV, ask about PrEP medicine to prevent HIV.  Get tested for HIV at least once in your life, whether you are at risk for HIV or not.  Cancer screening tests  Cervical cancer screening: If you have a cervix, begin getting regular cervical cancer screening tests starting at age 21.  Breast cancer scan (mammogram): If you've ever had breasts, begin having regular mammograms starting at age 40. This is a scan to check for breast cancer.  Colon cancer screening: It is important to start screening for colon cancer at age 45.  Have a colonoscopy test every 10 years (or more often if you're at risk) Or, ask your provider about stool tests like a FIT test every year or Cologuard test every 3 years.  To learn more about your testing options, visit:   .  For help making a decision, visit:   https://bit.ly/ry62639.  Prostate cancer screening test: If you have a prostate, ask your care team if a prostate cancer screening test (PSA) at age 55 is right for you.  Lung cancer screening: If you are a current or former smoker ages 50 to 80, ask your care team if ongoing lung cancer screenings are right for you.  For informational purposes only. Not to replace the advice of your health care provider. Copyright   2023 [employer]. All rights reserved. Clinically reviewed by the [facility] Transitions Program. [employer] [id number] - REV 01/24.

## 2024-07-17 NOTE — PROGRESS NOTES
"Preventive Care Visit  [facility]  Blas Sims MD, Family Medicine  Jul 17, 2024      Assessment & Plan     Routine general medical examination at a health care facility  Patient was advised on recommended screening and preventive health recommendations.  He verbalized understanding and agreed to the plans below.     Subclinical hypothyroidism  Will adjust med dose depending on result.  - levothyroxine (SYNTHROID/LEVOTHROID) 100 MCG tablet  Dispense: 90 tablet; Refill: 0  - TSH with free T4 reflex  - OFFICE/OUTPT VISIT,LARISSA ROSALES III    Screen for colon cancer  Patient prefers to check with insurance what is covered for screening but did prefer to place the colonoscopy order.  - Colonoscopy Screening  Referral    Need for hepatitis C screening test  Offered screening based on current recommendations. Patient concurred to screen.   - Hepatitis C Screen Reflex to HCV RNA Quant and Genotype    Screening for diabetes mellitus  - Glucose    Lipid screening  - Lipid panel reflex to direct LDL Fasting    Encounter for screening mammogram for breast cancer  - MA Screen Bilateral w/Good      Patient has been advised of split billing requirements and indicates understanding: Yes        BMI  Estimated body mass index is 28.56 kg/m  as calculated from the following:    Height as of this encounter: 1.651 m (5' 5\").    Weight as of this encounter: 77.8 kg (171 lb 9.6 oz).   Weight management plan: Discussed healthy diet and exercise guidelines    Counseling  Appropriate preventive services were addressed with this patient via screening, questionnaire, or discussion as appropriate for fall prevention, nutrition, physical activity, Tobacco-use cessation, weight loss and cognition.  Checklist reviewing preventive services available has been given to the patient.  Reviewed patient's diet, addressing concerns and/or questions.   The patient was instructed to see the dentist every 6 months. "       Patient Instructions   Patient Education    To schedule the mammogram, call [phone].     Colonoscopy  will call you in the next 3-5 business days to set up appointment for the procedure. Referral information is in your visit summary also.  You may schedule it at your convenience within the next 12 months.    Thyroid test to be done in the fall together with fasting lab tests..  Continue levothyroxine for now.    Be consistent with low salt, low trans fat and low saturated fat diet.  Eat food rich in omega-3-fatty acids as you tolerate. (salmon, olive oil)  Eat 5 cups of vegetables, fruits and whole grains per day.  Limit starchy food (white rice, white bread, white pasta, white potatoes) to less than a cup per meal.  Minimize sweets, junk food and fastfood. Limit soda beverages to one serving per day; best to avoid it altogether though.    Exercise: moderate intensity sustained for at least 30 mins per episode, goal of 150 mins per week at least  Combine cardiovascular and resistance exercises.  These exercise recommendations are in addition to your daily activity at work or home.  Work on losing weight.    Get flu and covid vaccines in the fall.  You may get the Shingrix vaccine for shingles if you desire, and after you verify with insurance how they cover the vaccine.   Plan to get hepatitis B immunization series if you have not received it or unsure if you have completed it before.      Preventative Care Visits include: Yearly physicals, Well-child visits, Welcome to Medicare visits, & Medicare yearly wellness exams.    The purpose of these visits is to discuss your medical history and prevent health problems before you are sick.  You may need to pay a copay, coinsurance or deductible if your visit today includes testing or treating for a new or existing condition.    Additional charges may be incurred for today's visit. If you have questions about what your insurance plan covers, please  contact your Insurance Company's member service department.  If you have questions specific to a bill you have already received from [employer], please contact the [facility] Billing Office at [phone].    Preventive Care Advice   This is general advice given by our system to help you stay healthy. However, your care team may have specific advice just for you. Please talk to your care team about your preventive care needs.  Nutrition  Eat 5 or more servings of fruits and vegetables each day.  Try wheat bread, brown rice and whole grain pasta (instead of white bread, rice, and pasta).  Get enough calcium and vitamin D. Check the label on foods and aim for 100% of the RDA (recommended daily allowance).  Lifestyle  Exercise at least 150 minutes each week  (30 minutes a day, 5 days a week).  Do muscle strengthening activities 2 days a week. These help control your weight and prevent disease.  No smoking.  Wear sunscreen to prevent skin cancer.  Have a dental exam and cleaning every 6 months.  Yearly exams  See your health care team every year to talk about:  Any changes in your health.  Any medicines your care team has prescribed.  Preventive care, family planning, and ways to prevent chronic diseases.  Shots (vaccines)   HPV shots (up to age 26), if you've never had them before.  Hepatitis B shots (up to age 59), if you've never had them before.  COVID-19 shot: Get this shot when it's due.  Flu shot: Get a flu shot every year.  Tetanus shot: Get a tetanus shot every 10 years.  Pneumococcal, hepatitis A, and RSV shots: Ask your care team if you need these based on your risk.  Shingles shot (for age 50 and up)  General health tests  Diabetes screening:  Starting at age 35, Get screened for diabetes at least every 3 years.  If you are younger than age 35, ask your care team if you should be screened for diabetes.  Cholesterol test: At age 39, start having a cholesterol test every 5 years, or more often if advised.  Bone  density scan (DEXA): At age 50, ask your care team if you should have this scan for osteoporosis (brittle bones).  Hepatitis C: Get tested at least once in your life.  STIs (sexually transmitted infections)  Before age 24: Ask your care team if you should be screened for STIs.  After age 24: Get screened for STIs if you're at risk. You are at risk for STIs (including HIV) if:  You are sexually active with more than one person.  You don't use condoms every time.  You or a partner was diagnosed with a sexually transmitted infection.  If you are at risk for HIV, ask about PrEP medicine to prevent HIV.  Get tested for HIV at least once in your life, whether you are at risk for HIV or not.  Cancer screening tests  Cervical cancer screening: If you have a cervix, begin getting regular cervical cancer screening tests starting at age 21.  Breast cancer scan (mammogram): If you've ever had breasts, begin having regular mammograms starting at age 40. This is a scan to check for breast cancer.  Colon cancer screening: It is important to start screening for colon cancer at age 45.  Have a colonoscopy test every 10 years (or more often if you're at risk) Or, ask your provider about stool tests like a FIT test every year or Cologuard test every 3 years.  To learn more about your testing options, visit:   .  For help making a decision, visit:   https://bit.ly/fu64397.  Prostate cancer screening test: If you have a prostate, ask your care team if a prostate cancer screening test (PSA) at age 55 is right for you.  Lung cancer screening: If you are a current or former smoker ages 50 to 80, ask your care team if ongoing lung cancer screenings are right for you.  For informational purposes only. Not to replace the advice of your health care provider. Copyright   2023 [employer]. All rights reserved. Clinically reviewed by the [facility] Transitions Program. [employer] [id number] - REV 01/24.       Lilibeth le  a 54 year old, presenting for the following:  Physical        7/17/2024     8:16 AM   Additional Questions   Roomed by Christina BROWN   Accompanied by self        Health Care Directive  Patient does not have a Health Care Directive or Living Will: Discussed advance care planning with patient; however, patient declined at this time.    HPI      Hypothyroidism Follow-up    Since last visit, patient describes the following symptoms: Weight stable, no hair loss, no skin changes, no constipation, no loose stools          7/17/2024   General Health   How would you rate your overall physical health? Good   Feel stress (tense, anxious, or unable to sleep) To some extent      (!) STRESS CONCERN      7/17/2024   Nutrition   Three or more servings of calcium each day? Yes   Diet: Regular (no restrictions)   How many servings of fruit and vegetables per day? (!) 2-3   How many sweetened beverages each day? 0-1            7/17/2024   Exercise   Days per week of moderate/strenous exercise 4 days            7/17/2024   Social Factors   Frequency of gathering with friends or relatives Once a week   Worry food won't last until get money to buy more No   Food not last or not have enough money for food? No   Do you have housing? (Housing is defined as stable permanent housing and does not include staying ouside in a car, in a tent, in an abandoned building, in an overnight shelter, or couch-surfing.) Yes   Are you worried about losing your housing? No   Lack of transportation? No   Unable to get utilities (heat,electricity)? No            7/17/2024   Fall Risk   Fallen 2 or more times in the past year? No   Trouble with walking or balance? No             7/17/2024   Dental   Dentist two times every year? (!) NO            7/17/2024   TB Screening   Were you born outside of the [place]? No            Today's PHQ-2 Score:       7/17/2024     8:08 AM   PHQ-2 ( 1999 [employer])   Q1: Little interest or pleasure in doing things 0   Q2: Feeling down,  depressed or hopeless 0   PHQ-2 Score 0   Q1: Little interest or pleasure in doing things Not at all   Q2: Feeling down, depressed or hopeless Not at all   PHQ-2 Score 0           7/17/2024   Substance Use   Alcohol more than 3/day or more than 7/wk No   Do you use any other substances recreationally? No        Social History     Tobacco Use    Smoking status: Never    Smokeless tobacco: Never   Vaping Use    Vaping status: Never Used   Substance Use Topics    Alcohol use: Yes     Comment: 1 PER WEEK    Drug use: No           4/24/2023   LAST FHS-7 RESULTS   1st degree relative breast or ovarian cancer No   Any relative bilateral breast cancer No   Any male have breast cancer No   Any ONE woman have BOTH breast AND ovarian cancer No   Any woman with breast cancer before 50yrs No   2 or more relatives with breast AND/OR ovarian cancer No   2 or more relatives with breast AND/OR bowel cancer No           Mammogram Screening - Mammogram every 1-2 years updated in Health Maintenance based on mutual decision making        7/17/2024   STI Screening   New sexual partner(s) since last STI/HIV test? No        History of abnormal Pap smear: No - age 30-64 HPV with reflex Pap every 5 years recommended        Latest Ref Rng & Units 2/8/2022    10:52 AM 2/6/2019     2:40 PM 2/6/2019     2:37 PM   PAP / HPV   PAP  Negative for Intraepithelial Lesion or Malignancy (NILM)      PAP (Historical)    NIL    HPV 16 DNA Negative Negative  Positive     HPV 18 DNA Negative Negative  Negative     Other HR HPV Negative Negative  Negative       ASCVD Risk   The 10-year ASCVD risk score (Emelia CRUZ, et al., 2019) is: 1.3%    Values used to calculate the score:      Age: 54 years      Sex: Female      Is Non- : No      Diabetic: No      Tobacco smoker: No      Systolic Blood Pressure: 136 mmHg      Is BP treated: No      HDL Cholesterol: 80 mg/dL      Total Cholesterol: 223 mg/dL           Reviewed and updated as  needed this visit by Provider     Meds                Past Medical History:   Diagnosis Date    Cervical high risk HPV (human papillomavirus) test positive 1/22/2018, 2/6/19    see problem list    Graves disease 1/22/2018     Past Surgical History:   Procedure Laterality Date    APPENDECTOMY      COLPOSCOPY CERVIX, BIOPSY CERVIX, ENDOCERVICAL CURETTAGE, COMBINED  02/22/2018    Negative    ECTOPIC PREGNANCY SURGERY      REPAIR PTOSIS Right 11/8/2018    Procedure: Right ptosis repair;  Surgeon: Seth Diaz MD;  Location: [facility] OR    REPAIR RETRACTION LID Left 11/8/2018    Procedure: Left retraction repair;  Surgeon: Seth Diaz MD;  Location: [facility] OR     Patient Active Problem List   Diagnosis    Graves disease    Proptosis due to thyroid disorder    Cervical high risk HPV (human papillomavirus) test positive    Subclinical hypothyroidism     Past Surgical History:   Procedure Laterality Date    APPENDECTOMY      COLPOSCOPY CERVIX, BIOPSY CERVIX, ENDOCERVICAL CURETTAGE, COMBINED  02/22/2018    Negative    ECTOPIC PREGNANCY SURGERY      REPAIR PTOSIS Right 11/8/2018    Procedure: Right ptosis repair;  Surgeon: Seth Diaz MD;  Location: [facility] OR    REPAIR RETRACTION LID Left 11/8/2018    Procedure: Left retraction repair;  Surgeon: Seth Diaz MD;  Location: [facility] OR       Social History     Tobacco Use    Smoking status: Never    Smokeless tobacco: Never   Substance Use Topics    Alcohol use: Yes     Comment: 1 PER WEEK     Family History   Problem Relation Age of Onset    Heart Disease Mother     Thyroid Disease Mother     Heart Disease Father     Hypertension Father     Thyroid Disease Maternal Grandmother     Heart Disease Brother         PACE MAKER    Thyroid Disease Daughter 10    Amblyopia No family hx of     Strabismus No family hx of          Current Outpatient Medications   Medication Sig Dispense Refill    levothyroxine (SYNTHROID/LEVOTHROID) 100 MCG tablet Take 1 tablet (100 mcg) by mouth  "daily 90 tablet 0     No Known Allergies      Review of Systems  Constitutional, HEENT, cardiovascular, pulmonary, GI, , musculoskeletal, neuro, skin, endocrine and psych systems are negative, except as otherwise noted.     Objective    Exam  /86   Pulse 66   Temp 97.2  F (36.2  C) (Tympanic)   Resp 20   Ht 1.651 m (5' 5\")   Wt 77.8 kg (171 lb 9.6 oz)   LMP 02/02/2021 (Approximate)   SpO2 98%   BMI 28.56 kg/m     Estimated body mass index is 28.56 kg/m  as calculated from the following:    Height as of this encounter: 1.651 m (5' 5\").    Weight as of this encounter: 77.8 kg (171 lb 9.6 oz).    Physical Exam  GENERAL APPEARANCE: overweight, alert and no distress  EYES: pink conj, no icterus, PERRL, EOMI  HENT: ear canals and TM's normal, nose and mouth without ulcers or lesions, oropharynx clear and oral mucous membranes moist  NECK: no adenopathy, no asymmetry, masses, or scars and thyroid normal to palpation  RESP: lungs clear to auscultation - no rales, rhonchi or wheezes  CV: regular rates and rhythm, normal S1 S2, no S3 or S4, no murmur, click or rub, no peripheral edema and peripheral pulses strong  ABDOMEN: soft, nontender, no hepatosplenomegaly, no masses and bowel sounds normal  RECTAL: deferred  MS: no musculoskeletal defects are noted and gait is age appropriate without ataxia  SKIN: no suspicious lesions or rashes  NEURO: Normal strength and tone, sensory exam grossly normal, mentation intact and speech normal         Signed Electronically by: Blas Sims MD    "

## 2024-11-25 ENCOUNTER — MYC REFILL (OUTPATIENT)
Dept: FAMILY MEDICINE | Facility: CLINIC | Age: 54
End: 2024-11-25
Payer: COMMERCIAL

## 2024-11-25 DIAGNOSIS — E03.8 SUBCLINICAL HYPOTHYROIDISM: ICD-10-CM

## 2024-11-25 RX ORDER — LEVOTHYROXINE SODIUM 100 UG/1
100 TABLET ORAL DAILY
Qty: 90 TABLET | Refills: 0 | Status: SHIPPED | OUTPATIENT
Start: 2024-11-25

## 2024-12-18 ENCOUNTER — PATIENT OUTREACH (OUTPATIENT)
Dept: CARE COORDINATION | Facility: CLINIC | Age: 54
End: 2024-12-18
Payer: COMMERCIAL

## 2025-01-07 ENCOUNTER — TELEPHONE (OUTPATIENT)
Dept: FAMILY MEDICINE | Facility: CLINIC | Age: 55
End: 2025-01-07
Payer: COMMERCIAL

## 2025-01-07 NOTE — TELEPHONE ENCOUNTER
Patient Quality Outreach    Patient is due for the following:   Colon Cancer Screening  Cervical Cancer Screening - PAP Needed    Action(s) Taken:   Pt needs to schedule a visit to get a colon cancer screening referral.  Pt needs to schedule visit to discuss cervical cancer screening.      Type of outreach:    Sent [employer] message.    Questions for provider review:    None           Jaycee Rae MA

## 2025-07-12 ENCOUNTER — HEALTH MAINTENANCE LETTER (OUTPATIENT)
Age: 55
End: 2025-07-12

## 2025-08-23 ENCOUNTER — HEALTH MAINTENANCE LETTER (OUTPATIENT)
Age: 55
End: 2025-08-23

## (undated) DEVICE — PACK MINOR EYE CUSTOM ASC

## (undated) DEVICE — EYE PREP BETADINE 5% SOLUTION 30ML 0065-0411-30

## (undated) DEVICE — ESU EYE HIGH TEMP 65410-183

## (undated) DEVICE — SU PLAIN 6-0 G-1DA 18" 770G

## (undated) DEVICE — SYR 03ML LL W/O NDL 309657

## (undated) DEVICE — GLOVE PROTEXIS MICRO 7.5  2D73PM75

## (undated) DEVICE — NDL 30GA 0.5" 305106

## (undated) DEVICE — SOL WATER IRRIG 500ML BOTTLE 2F7113

## (undated) DEVICE — SU CHROMIC 5-0 P-3 18" 687G

## (undated) DEVICE — LINEN TOWEL PACK X5 5464

## (undated) DEVICE — BLADE KNIFE SURG 15 371115

## (undated) DEVICE — PEN MARKING SKIN TYCO DEVON DUAL TIP 31145868

## (undated) RX ORDER — LIDOCAINE HYDROCHLORIDE 20 MG/ML
INJECTION, SOLUTION EPIDURAL; INFILTRATION; INTRACAUDAL; PERINEURAL
Status: DISPENSED
Start: 2018-11-08

## (undated) RX ORDER — ACETAMINOPHEN 325 MG/1
TABLET ORAL
Status: DISPENSED
Start: 2018-11-08

## (undated) RX ORDER — FENTANYL CITRATE 50 UG/ML
INJECTION, SOLUTION INTRAMUSCULAR; INTRAVENOUS
Status: DISPENSED
Start: 2018-11-08

## (undated) RX ORDER — PROPOFOL 10 MG/ML
INJECTION, EMULSION INTRAVENOUS
Status: DISPENSED
Start: 2018-11-08